# Patient Record
Sex: FEMALE | Race: BLACK OR AFRICAN AMERICAN
[De-identification: names, ages, dates, MRNs, and addresses within clinical notes are randomized per-mention and may not be internally consistent; named-entity substitution may affect disease eponyms.]

---

## 2017-03-08 ENCOUNTER — HOSPITAL ENCOUNTER (INPATIENT)
Dept: HOSPITAL 62 - ER | Age: 82
LOS: 5 days | Discharge: HOME | DRG: 853 | End: 2017-03-13
Attending: FAMILY MEDICINE | Admitting: FAMILY MEDICINE
Payer: SELF-PAY

## 2017-03-08 DIAGNOSIS — K80.20: ICD-10-CM

## 2017-03-08 DIAGNOSIS — Z79.899: ICD-10-CM

## 2017-03-08 DIAGNOSIS — E04.1: ICD-10-CM

## 2017-03-08 DIAGNOSIS — I10: ICD-10-CM

## 2017-03-08 DIAGNOSIS — G93.40: ICD-10-CM

## 2017-03-08 DIAGNOSIS — L97.519: ICD-10-CM

## 2017-03-08 DIAGNOSIS — E11.621: ICD-10-CM

## 2017-03-08 DIAGNOSIS — L02.611: ICD-10-CM

## 2017-03-08 DIAGNOSIS — Z79.84: ICD-10-CM

## 2017-03-08 DIAGNOSIS — E11.65: ICD-10-CM

## 2017-03-08 DIAGNOSIS — A41.9: Primary | ICD-10-CM

## 2017-03-08 DIAGNOSIS — E83.42: ICD-10-CM

## 2017-03-08 DIAGNOSIS — E87.6: ICD-10-CM

## 2017-03-08 LAB
ALBUMIN SERPL-MCNC: 4.7 G/DL (ref 3.5–5)
ALP SERPL-CCNC: 130 U/L (ref 38–126)
ALT SERPL-CCNC: 19 U/L (ref 9–52)
ANION GAP SERPL CALC-SCNC: 17 MMOL/L (ref 5–19)
ANISOCYTOSIS BLD QL SMEAR: SLIGHT
APPEARANCE UR: CLEAR
APTT BLD: 22.4 SEC (ref 23.5–35.8)
AST SERPL-CCNC: 28 U/L (ref 14–36)
BASOPHILS NFR BLD MANUAL: 0 % (ref 0–2)
BILIRUB DIRECT SERPL-MCNC: 0 MG/DL (ref 0–0.3)
BILIRUB SERPL-MCNC: 2.2 MG/DL (ref 0.2–1.3)
BILIRUB UR QL STRIP: NEGATIVE
BUN SERPL-MCNC: 14 MG/DL (ref 7–20)
CALCIUM: 10.1 MG/DL (ref 8.4–10.2)
CHLORIDE SERPL-SCNC: 91 MMOL/L (ref 98–107)
CK MB SERPL-MCNC: 0.52 NG/ML (ref ?–4.55)
CK SERPL-CCNC: 37 U/L (ref 30–135)
CO2 SERPL-SCNC: 26 MMOL/L (ref 22–30)
CREAT SERPL-MCNC: 0.73 MG/DL (ref 0.52–1.25)
EOSINOPHIL NFR BLD MANUAL: 1 % (ref 0–6)
ERYTHROCYTE [DISTWIDTH] IN BLOOD BY AUTOMATED COUNT: 15.9 % (ref 11.5–14)
GLUCOSE SERPL-MCNC: 607 MG/DL (ref 75–110)
GLUCOSE UR STRIP-MCNC: >=500 MG/DL
HCT VFR BLD CALC: 39.8 % (ref 36–47)
HGB BLD-MCNC: 13.3 G/DL (ref 12–15.5)
HGB HCT DIFFERENCE: 0.1
KETONES UR STRIP-MCNC: 20 MG/DL
MCH RBC QN AUTO: 27.4 PG (ref 27–33.4)
MCHC RBC AUTO-ENTMCNC: 33.4 G/DL (ref 32–36)
MCV RBC AUTO: 82 FL (ref 80–97)
NITRITE UR QL STRIP: NEGATIVE
NRBC BLD AUTO-RTO: 2 /100 WBC
PH UR STRIP: 6 [PH] (ref 5–9)
POTASSIUM SERPL-SCNC: 3.4 MMOL/L (ref 3.6–5)
PROT SERPL-MCNC: 9 G/DL (ref 6.3–8.2)
PROT UR STRIP-MCNC: NEGATIVE MG/DL
PROTHROMBIN TIME: 12.9 SEC (ref 11.4–15.4)
RBC # BLD AUTO: 4.84 10^6/UL (ref 3.72–5.28)
SODIUM SERPL-SCNC: 133.5 MMOL/L (ref 137–145)
SP GR UR STRIP: 1.02
TARGETS BLD QL SMEAR: (no result)
TOTAL CELLS COUNTED BLD: 100
TOXIC GRANULES BLD QL SMEAR: SLIGHT
TROPONIN I SERPL-MCNC: < 0.012 NG/ML
UROBILINOGEN UR-MCNC: NEGATIVE MG/DL (ref ?–2)
VARIANT LYMPHS NFR BLD MANUAL: 32 % (ref 13–45)
WBC # BLD AUTO: 11.3 10^3/UL (ref 4–10.5)

## 2017-03-08 PROCEDURE — 84481 FREE ASSAY (FT-3): CPT

## 2017-03-08 PROCEDURE — 87070 CULTURE OTHR SPECIMN AEROBIC: CPT

## 2017-03-08 PROCEDURE — 81001 URINALYSIS AUTO W/SCOPE: CPT

## 2017-03-08 PROCEDURE — 80053 COMPREHEN METABOLIC PANEL: CPT

## 2017-03-08 PROCEDURE — 87804 INFLUENZA ASSAY W/OPTIC: CPT

## 2017-03-08 PROCEDURE — 80307 DRUG TEST PRSMV CHEM ANLYZR: CPT

## 2017-03-08 PROCEDURE — 80202 ASSAY OF VANCOMYCIN: CPT

## 2017-03-08 PROCEDURE — 86235 NUCLEAR ANTIGEN ANTIBODY: CPT

## 2017-03-08 PROCEDURE — 71010: CPT

## 2017-03-08 PROCEDURE — 85652 RBC SED RATE AUTOMATED: CPT

## 2017-03-08 PROCEDURE — 82803 BLOOD GASES ANY COMBINATION: CPT

## 2017-03-08 PROCEDURE — 86225 DNA ANTIBODY NATIVE: CPT

## 2017-03-08 PROCEDURE — 87086 URINE CULTURE/COLONY COUNT: CPT

## 2017-03-08 PROCEDURE — 82945 GLUCOSE OTHER FLUID: CPT

## 2017-03-08 PROCEDURE — 82140 ASSAY OF AMMONIA: CPT

## 2017-03-08 PROCEDURE — 83036 HEMOGLOBIN GLYCOSYLATED A1C: CPT

## 2017-03-08 PROCEDURE — 84484 ASSAY OF TROPONIN QUANT: CPT

## 2017-03-08 PROCEDURE — 76536 US EXAM OF HEAD AND NECK: CPT

## 2017-03-08 PROCEDURE — 70450 CT HEAD/BRAIN W/O DYE: CPT

## 2017-03-08 PROCEDURE — 77003 FLUOROGUIDE FOR SPINE INJECT: CPT

## 2017-03-08 PROCEDURE — 85610 PROTHROMBIN TIME: CPT

## 2017-03-08 PROCEDURE — 71260 CT THORAX DX C+: CPT

## 2017-03-08 PROCEDURE — 82962 GLUCOSE BLOOD TEST: CPT

## 2017-03-08 PROCEDURE — 84157 ASSAY OF PROTEIN OTHER: CPT

## 2017-03-08 PROCEDURE — 93010 ELECTROCARDIOGRAM REPORT: CPT

## 2017-03-08 PROCEDURE — 85025 COMPLETE CBC W/AUTO DIFF WBC: CPT

## 2017-03-08 PROCEDURE — 83735 ASSAY OF MAGNESIUM: CPT

## 2017-03-08 PROCEDURE — 82553 CREATINE MB FRACTION: CPT

## 2017-03-08 PROCEDURE — 84100 ASSAY OF PHOSPHORUS: CPT

## 2017-03-08 PROCEDURE — 87040 BLOOD CULTURE FOR BACTERIA: CPT

## 2017-03-08 PROCEDURE — 36415 COLL VENOUS BLD VENIPUNCTURE: CPT

## 2017-03-08 PROCEDURE — 80076 HEPATIC FUNCTION PANEL: CPT

## 2017-03-08 PROCEDURE — 76705 ECHO EXAM OF ABDOMEN: CPT

## 2017-03-08 PROCEDURE — 86430 RHEUMATOID FACTOR TEST QUAL: CPT

## 2017-03-08 PROCEDURE — 93976 VASCULAR STUDY: CPT

## 2017-03-08 PROCEDURE — 84443 ASSAY THYROID STIM HORMONE: CPT

## 2017-03-08 PROCEDURE — 93005 ELECTROCARDIOGRAM TRACING: CPT

## 2017-03-08 PROCEDURE — 62270 DX LMBR SPI PNXR: CPT

## 2017-03-08 PROCEDURE — 89050 BODY FLUID CELL COUNT: CPT

## 2017-03-08 PROCEDURE — 74177 CT ABD & PELVIS W/CONTRAST: CPT

## 2017-03-08 PROCEDURE — 87205 SMEAR GRAM STAIN: CPT

## 2017-03-08 PROCEDURE — 85730 THROMBOPLASTIN TIME PARTIAL: CPT

## 2017-03-08 PROCEDURE — 82550 ASSAY OF CK (CPK): CPT

## 2017-03-08 PROCEDURE — 80048 BASIC METABOLIC PNL TOTAL CA: CPT

## 2017-03-08 PROCEDURE — 84439 ASSAY OF FREE THYROXINE: CPT

## 2017-03-08 NOTE — EKG REPORT
SEVERITY:- ABNORMAL ECG -

SINUS RHYTHM

FIRST DEGREE AV BLOCK

:

Confirmed by: Domonique Frey 08-Mar-2017 21:38:03

## 2017-03-08 NOTE — ER DOCUMENT REPORT
ED General





- General


Time seen by provider: 19:30


Mode of Arrival: Medic


Information source: Relative





<NILESEVERARDO ORTIZ - Last Filed: 03/08/17 20:00>





<GI MENA - Last Filed: 03/09/17 03:58>





- General


Chief Complaint: S/S of Possible Stroke


Stated Complaint: STROKE LIKE SYMPTOMS


Notes: 


81-year-old female with report by son that she became confused and had slurred 

speech and vomiting and complaint of right-sided headache about 5:00 this 

afternoon.  Son reports patient had some diarrhea yesterday but seemed fine 

this morning.  He reports that she is from [place] and immigrated here November.  

He is unfamiliar with most of her past medical history but says that she is on 

no medicines now.  Patient does not speak English but he reports that she is 

confused and not speaking clearly in her native language.  He reports that she 

is also not following commands and seems agitated.  2 His knowledge she has had 

no recent fever, chills, cough, or complaints of pain anywhere.


Physical Exam:





General: Alert, confused and agitated





HEENT: Normocephalic. Atraumatic. PERRLA. Extraocular movements intact. 

Oropharynx clear.  He does membranes dry





Neck: Supple. Non-tender.  No JVD





Respiratory: No respiratory distress. Clear and equal breath sounds bilaterally.





Cardiovascular: The cardiac rhythm. 





Abdominal: Normal Inspection. Soft, non-tender. No distension. Normal Bowel 

Sounds. 





Back: Non-tender. No deformity or step off.





Extremities: Moves all four extremities.


Upper extremities: Normal inspection. Non-tender. Normal color. Normal ROM. 

Normal temperature. 


Lower extremities: Normal inspection. Non-tender. No edema. Normal color. 

Normal ROM. Normal temperature. 





Neurological: Per the patient's son her speech is garbled.  She appears to have 

a minimal left facial droop but cannot cooperate with further cranial nerve 

testing.  She is observed to move all fours extremities spontaneously with what 

appears to be equal strength but cannot otherwise cooperate with formal 

neurologic testing.





. 





Skin: Warm. Dry. Normal color.


 (EVERARDO CAGE)





Past Medical History





- General


Cannot obtain history due to: Uncooperative, Altered mental status





- Social History


Smoking Status: Never Smoker


Family History: Other - None known to son patient could not answer


Renal/ Medical History: Denies: Hx Peritoneal Dialysis


Surgical Hx: Negative





<EVERARDO CAGE - Last Filed: 03/08/17 20:00>





Review of Systems





- Review of Systems


-: Yes ROS unobtainable due to patient's medical condition





<EVERARDO CAGE - Last Filed: 03/08/17 20:00>





Physical Exam





- Vital signs


Interpretation: Tachycardic, Febrile





- General


General appearance: Alert


In distress: Mild





- HEENT


Head: Normocephalic, Atraumatic


Cornea: Normal


Eyelashes: Normal


Pupils: PERRL


Nasal: Normal


Mucous membranes: Dry


Pharynx: Normal





- Respiratory


Respiratory status: No respiratory distress


Breath sounds: Decreased air movement - At bases





- Cardiovascular


Rhythm: Regular, Tachycardia





- Abdominal


Inspection: Normal


Tenderness: Nontender





- Back


Back: Normal





- Extremities


General upper extremity: Normal inspection, Normal ROM


General lower extremity: Normal inspection, Normal ROM





- Neurological


Cognition: Confused


Mina Coma Scale Eye Opening: Spontaneous


Mina Coma Scale Verbal: Oriented


Mina Coma Scale Motor: Obeys Commands


Mina Coma Scale Total: 15





- Psychological


Associated symptoms: Normal affect, Normal mood





- Skin


Skin Temperature: Hot





<GI MENA - Last Filed: 03/09/17 03:58>





- Vital signs


Vitals: 


 











Pulse Ox


 


 98 


 


 03/08/17 18:01














Course





- Laboratory


Result Diagrams: 


 03/08/17 18:53





 03/08/17 18:53





- Diagnostic Test


Radiology reviewed: Image reviewed, Reports reviewed





<EVERARDO CAGE - Last Filed: 03/08/17 20:00>





- Laboratory


Result Diagrams: 


 03/08/17 18:53





 03/08/17 18:53





<GI MENA - Last Filed: 03/09/17 03:58>





- Re-evaluation


Re-evalutation: 





03/09/17 03:55


Patient is a 81-year-old female who was brought into the emergency department 

for possible stroke.  Patient was initially evaluated by Dr. Cage.  Patient 

with no acute findings for CVA.  CT with no acute findings.  Patient was found 

to have a fever 103.  Son states that the patient has not had any recent 

infection.  Fever was treated and confusion resolving.  I do not have any 

specific source for infection but there are some questionable atelectasis and I 

suspect the patient has pneumonia.  Patient is interactive, smiling.  No nuchal 

rigidity.  I do not think that lumbar puncture is warranted on this 81-year-old 

patient at this time.  Patient was discussed with the hospitalist will be 

admitted to the ICU.  Antibiotics have been initiated after cultures were 

obtained.  Stable time of admission. (GI MENA)





- Vital Signs


Vital signs: 


 











Temp Pulse Resp BP Pulse Ox


 


 102.9 F H  80   14   164/104 H  97 


 


 03/09/17 00:48  03/08/17 18:20  03/08/17 22:00  03/08/17 21:00  03/08/17 22:00














- Laboratory


Laboratory results interpreted by me: 


 











  03/08/17 03/08/17 03/08/17





  18:53 18:53 19:40


 


WBC  11.3 H  


 


RDW  15.9 H  


 


APTT    22.4 L


 


Sodium   133.5 L 


 


Potassium   3.4 L 


 


Chloride   91 L 


 


Glucose   607 H* 


 


POC Glucose   


 


Total Bilirubin   2.2 H 


 


Alkaline Phosphatase   130 H 


 


Total Protein   9.0 H 


 


Urine Glucose (UA)   


 


Urine Ketones   














  03/08/17 03/08/17 03/08/17





  20:40 20:42 21:54


 


WBC   


 


RDW   


 


APTT   


 


Sodium   


 


Potassium   


 


Chloride   


 


Glucose   


 


POC Glucose  500 H*   219 H


 


Total Bilirubin   


 


Alkaline Phosphatase   


 


Total Protein   


 


Urine Glucose (UA)   >=500 H 


 


Urine Ketones   20 H 














  03/08/17 03/09/17





  23:01 00:55


 


WBC  


 


RDW  


 


APTT  


 


Sodium  


 


Potassium  


 


Chloride  


 


Glucose  


 


POC Glucose  277 H  344 H


 


Total Bilirubin  


 


Alkaline Phosphatase  


 


Total Protein  


 


Urine Glucose (UA)  


 


Urine Ketones  














Critical Care Note





- Critical Care Note


Total time excluding time spent on procedures (mins): 35 - evaluation and 

management of febrile patient with multiple re-evaluations and workup of fever





<GI MENA - Last Filed: 03/09/17 03:58>





Discharge





<EVERARDO CAGE - Last Filed: 03/08/17 20:00>





- Discharge


Admitting Provider: Hospitalist - [place]


Unit Admitted: ICU





<GI MENA - Last Filed: 03/09/17 03:58>





- Discharge


Clinical Impression: 


 New onset type 2 diabetes mellitus





Fever


Qualifiers:


 Fever type: unspecified Qualified Code(s): R50.9 - Fever, unspecified





Condition: Stable


Disposition: ADMITTED AS INPATIENT

## 2017-03-08 NOTE — ER DOCUMENT REPORT
ED Medical Screen (RME)





- General


Stated Complaint: STROKE LIKE SYMPTOMS


Notes: 


80 yo female brought to ED by family for slurred speech and confusion.  family 

found pt with vomit on her.  last seen normal approx 5pm.


pt speaks [place]. 


no previous hx/o CVA

## 2017-03-09 LAB
ALBUMIN SERPL-MCNC: 4.5 G/DL (ref 3.5–5)
ALP SERPL-CCNC: 104 U/L (ref 38–126)
ALT SERPL-CCNC: 28 U/L (ref 9–52)
ANION GAP SERPL CALC-SCNC: 12 MMOL/L (ref 5–19)
ANION GAP SERPL CALC-SCNC: 17 MMOL/L (ref 5–19)
APPEARANCE TUBE1 CSF: CLEAR
APPEARANCE TUBE2 CSF: CLEAR
AST SERPL-CCNC: 21 U/L (ref 14–36)
BARBITURATES UR QL SCN: NEGATIVE
BASE EXCESS BLDV CALC-SCNC: -2.7 MMOL/L
BASOPHILS # BLD AUTO: 0.1 10^3/UL (ref 0–0.2)
BASOPHILS NFR BLD AUTO: 0.6 % (ref 0–2)
BILIRUB DIRECT SERPL-MCNC: 0 MG/DL (ref 0–0.3)
BILIRUB SERPL-MCNC: 2.8 MG/DL (ref 0.2–1.3)
BUN SERPL-MCNC: 7 MG/DL (ref 7–20)
BUN SERPL-MCNC: 8 MG/DL (ref 7–20)
CALCIUM: 9.7 MG/DL (ref 8.4–10.2)
CALCIUM: 9.7 MG/DL (ref 8.4–10.2)
CHLORIDE SERPL-SCNC: 98 MMOL/L (ref 98–107)
CHLORIDE SERPL-SCNC: 98 MMOL/L (ref 98–107)
CO2 SERPL-SCNC: 25 MMOL/L (ref 22–30)
CO2 SERPL-SCNC: 30 MMOL/L (ref 22–30)
CREAT SERPL-MCNC: 0.47 MG/DL (ref 0.52–1.25)
CREAT SERPL-MCNC: 0.49 MG/DL (ref 0.52–1.25)
EOSINOPHIL # BLD AUTO: 0 10^3/UL (ref 0–0.6)
EOSINOPHIL NFR BLD AUTO: 0 % (ref 0–6)
ERYTHROCYTE [DISTWIDTH] IN BLOOD BY AUTOMATED COUNT: 16.1 % (ref 11.5–14)
ETHANOL SERPL-MCNC: < 10 MG/DL
GLUCOSE CSF-MCNC: 126 MG/DL (ref 40–70)
GLUCOSE SERPL-MCNC: 131 MG/DL (ref 75–110)
GLUCOSE SERPL-MCNC: 258 MG/DL (ref 75–110)
HCO3 BLDV-SCNC: 21.9 MMOL/L (ref 20–32)
HCT VFR BLD CALC: 38.1 % (ref 36–47)
HGB BLD-MCNC: 12.5 G/DL (ref 12–15.5)
HGB HCT DIFFERENCE: -0.6
LYMPHOCYTES # BLD AUTO: 2.2 10^3/UL (ref 0.5–4.7)
LYMPHOCYTES NFR BLD AUTO: 15.3 % (ref 13–45)
MAGNESIUM SERPL-MCNC: 1.5 MG/DL (ref 1.6–2.3)
MCH RBC QN AUTO: 27.5 PG (ref 27–33.4)
MCHC RBC AUTO-ENTMCNC: 32.9 G/DL (ref 32–36)
MCV RBC AUTO: 84 FL (ref 80–97)
METHADONE UR QL SCN: NEGATIVE
MONOCYTES # BLD AUTO: 0.9 10^3/UL (ref 0.1–1.4)
MONOCYTES NFR BLD AUTO: 5.9 % (ref 3–13)
NEUTROPHILS # BLD AUTO: 11.4 10^3/UL (ref 1.7–8.2)
NEUTS SEG NFR BLD AUTO: 78.2 % (ref 42–78)
PCO2 BLDV: 37.6 MMHG (ref 35–63)
PCP UR QL SCN: NEGATIVE
PH BLDV: 7.38 [PH] (ref 7.3–7.42)
PHOSPHATE SERPL-MCNC: 2.9 MG/DL (ref 2.5–4.5)
POTASSIUM SERPL-SCNC: 3.1 MMOL/L (ref 3.6–5)
POTASSIUM SERPL-SCNC: 3.1 MMOL/L (ref 3.6–5)
PROT SERPL-MCNC: 8.5 G/DL (ref 6.3–8.2)
RBC # BLD AUTO: 4.55 10^6/UL (ref 3.72–5.28)
RBC AVERAGE: 121.5
RBC DILUENT USED: (no result)
RBC DILUTION FACTOR: 1
RBC SIDE 1: 120
RBC SIDE 2: 123
SODIUM SERPL-SCNC: 139.5 MMOL/L (ref 137–145)
SODIUM SERPL-SCNC: 139.9 MMOL/L (ref 137–145)
T3FREE SERPL-MCNC: 2.79 PG/ML (ref 2.77–5.27)
TOTAL RBC SQUARES COUNTED: 125
URINE OPIATES LOW: NEGATIVE
WBC # BLD AUTO: 14.6 10^3/UL (ref 4–10.5)

## 2017-03-09 PROCEDURE — 009U3ZX DRAINAGE OF SPINAL CANAL, PERCUTANEOUS APPROACH, DIAGNOSTIC: ICD-10-PCS | Performed by: FAMILY MEDICINE

## 2017-03-09 PROCEDURE — B01B1ZZ FLUOROSCOPY OF SPINAL CORD USING LOW OSMOLAR CONTRAST: ICD-10-PCS | Performed by: FAMILY MEDICINE

## 2017-03-09 RX ADMIN — MAGNESIUM SULFATE IN DEXTROSE SCH ML: 10 INJECTION, SOLUTION INTRAVENOUS at 12:50

## 2017-03-09 RX ADMIN — Medication SCH ML: at 17:23

## 2017-03-09 RX ADMIN — PIPERACILLIN AND TAZOBACTAM SCH GM: 3; .375 INJECTION, POWDER, LYOPHILIZED, FOR SOLUTION INTRAVENOUS; PARENTERAL at 21:17

## 2017-03-09 RX ADMIN — ACYCLOVIR SODIUM SCH MG: 50 INJECTION, SOLUTION INTRAVENOUS at 17:31

## 2017-03-09 RX ADMIN — AMPICILLIN SCH GM: 2 INJECTION, POWDER, FOR SOLUTION INTRAVENOUS at 17:31

## 2017-03-09 RX ADMIN — ACYCLOVIR SODIUM SCH MG: 50 INJECTION, SOLUTION INTRAVENOUS at 10:52

## 2017-03-09 RX ADMIN — Medication SCH ML: at 21:27

## 2017-03-09 RX ADMIN — AMPICILLIN SCH GM: 2 INJECTION, POWDER, FOR SOLUTION INTRAVENOUS at 12:18

## 2017-03-09 RX ADMIN — SODIUM CHLORIDE AND POTASSIUM CHLORIDE PRN ML: .9; .15 SOLUTION INTRAVENOUS at 09:13

## 2017-03-09 RX ADMIN — MAGNESIUM SULFATE IN DEXTROSE SCH ML: 10 INJECTION, SOLUTION INTRAVENOUS at 12:00

## 2017-03-09 NOTE — PDOC H&P
History of Present Illness


Admission Date/PCP: 


  03/09/17 01:36





  


PCP--uncertain


Patient complains of: stroke like symptoms


History of Present Illness: 


JESUS SANDOVAL is a 81 year old known  female resident of [facility]

, having moved to this country in November to be with her son, who presents to 

the emergency room for evaluation of above.





Patient has been discussed with emergency room physician who evaluated the 

patient.  Patient is is able to provide no history whatsoever in terms of acute 

or chronic events, review of systems, personal habits, family history, etc. No 

friends or family are present. No inpatient records available for review.  





Per nursing staff, patient is more awake and alert than upon arrival but does 

not interact with her surroundings or staff in a meaningful fashion.  She 

speaks no English.  When we attempted to use the [employer] translation device, she 

would not respond despite a number of attempts by the online .





I left a generic voicemail to call me at the hospital as soon as possible with 

the contact number for her son at 2:30 AM on 03/09/2017.  Have not heard back.





Emergency room physician notes reviewed. No further information available this 

point in time. .


 


 


 


Laboratory results are listed in [employer] and are reviewed. 


 


 


X-ray summary results are listed below, with full report(s) reviewed. .


 


 


 


EKG reviewed.  No old EKG available for comparison.


 


 


 


Social history/personal habits: No further information available this point in 

time.


 


 


 


Allergies/adverse reactions No further information available this point in time.


 


 


Home medications according to his son's comments to ER staff, patient is on no 

medication at this point in time.


 





REVIEW OF SYSTEMS:  See history and present illness.No further information 

available this point in time.


 





 


 


PHYSICAL EXAMINATION:


 


5 feet 5 inches tall.  70.5 kg.  BMI 25.9 kg/m.  Blood pressure 140/93.  Pulse 

103 and regular.  95% saturation on room air.  Respirations are 19 and 

unlabored.  Temperature 102.9 shortly before 1 AM.  Skin does not feel is warm 

at the present time.





Well-nourished well-developed -American female who appears a fair number 

of years younger than her stated age.  She is awake, looking about the room in 

somewhat of a sluggish fashion.  Nonverbal.  Does not interact in a meaningful 

fashion with staff or her surroundings.





Female emergency room nurse Jania is present.


 


Skin is warm and dry.  No grossly obvious evidence of rash in areas of skin 

examined.  No subcutaneous nodules palpated.  No evidence of infection upon 

examination of all her skin surfaces, including buttock region, backs, and 

breasts.


 


ENT: [Hearing difficult to evaluate due to her current status.  No Tomlin sign


 


Eyes: No scleral icterus.  Pupils equal and reactive to light at 5 mm.  Pink 

conjunctivae.  No raccoon eyes


 


Neck is supple and nontender to gentle active range of motion and palpation.  

Midline trachea.  No palpable thyroid nodule mass enlargement or tenderness. 


 


Lymphatic: No palpable cervical or clavicular nodes. 


 


Neck and lymphatic exams limited by patient body habitus. 


 


Psychiatric: Not able to be adequately evaluated due to her current status.  


 


Lungs: Auscultation reveals clear and equal breath sounds bilaterally.  No use 

of accessory respiratory muscles. 


 


Cardiovascular: Heart regular rate and rhythm, without gallop murmur or rub.  

No carotid or abdominal aortic bruits.  No ankle or pedal edema.  Faintly 

palpable dorsalis pedis pulses. 


 


Abdomen: soft,  , slightly distended nontender with positive bowel sounds.  

Unable to adequately evaluate abdomen for masses or organomegaly due to 

distention.


 


Extremities: Hands and Feet are warm and dry.  No upper or lower extremity 

tenderness to compression.  No grossly obvious visual evidence of upper or 

lower extremity swelling.  Gentle manipulation of all 4 extremities fails to 

reveal any obvious evidence of injury or instability to involved major joints.  

Palpation of cranium, clavicles, thorax, upper and lower extremities, and 

pelvis failed to reveal any obvious evidence of injury or instability.


 


Neurologic: [Moves all 4 extremities grossly normally.  Patellar reflexes 

absent.  Absent Babinski.  Light touch can't be evaluated due to her current 

status..  No rigidity.  No ankle clonus.  No nystagmus.


 


 








Social History


Information Source: Emergency Med Personnel, [facility] Records


Lives with: Family - Likely lives with son.  Not completely certain.


Smoking Status: Unknown if Ever Smoked


Frequency of Alcohol Use: None - No  information available this point in time.


Hx Recreational Drug Use: No - No  information available this point in time.


Drugs: None - No  information available this point in time.


Hx Prescription Drug Abuse: No - No  information available this point in time.





- Advance Directive


Resuscitation Status: Full Code


Surrogate healthcare decision maker:: 


Probably son.





Family History


Family History: Other - None known to son patient could not answer


Parental Family History Reviewed: No - No  information available this point in 

time.


Children Family History Reviewed: No - No  information available this point in 

time.


Sibling(s) Family History Reviewed.: No - No  information available this point 

in time.





Physical Exam


Vital Signs: 


 











Temp Pulse Resp BP Pulse Ox


 


 102.9 F H  80   14   164/104 H  97 


 


 03/09/17 00:48  03/08/17 18:20  03/08/17 22:00  03/08/17 21:00  03/08/17 22:00














Results


Impressions: 


 





Chest X-Ray  03/08/17 18:01


IMPRESSION:  LOW LUNG VOLUMES.  NO SIGNIFICANT RADIOGRAPHIC FINDING IN THE 

CHEST.


 








Head CT  03/08/17 18:01


IMPRESSION:  CHRONIC CHANGES OF ATROPHY AND MICROVASCULAR ISCHEMIA.  NO ACUTE 

PROCESS.


 








Abdomen/Pelvis CT  03/09/17 00:00


IMPRESSION:  Gallstone.


L1 compression fracture age indeterminate.


 








Chest CT  03/09/17 00:00


IMPRESSION:  Dependent atelectasis.


1.7 cm mass in the thyroid gland on the right.


 














Assessment & Plan





- Diagnosis


(1) Acute encephalopathy


Is this a current diagnosis for this admission?: YesPlan: 





With fever, will proceed as for encephalitis, with empiric anabiotic coverage 

for meningitis.  Due to her elevated liver functions, will hold rifampin at 

that this point in time.





Planned lumbar puncture, image guided.





Multiple further labs.





Knee high SCDs for DVT prophylaxis,; with plan lumbar puncture, will forego 

Lovenox or heparin at this point in time.


 


Time spent in evaluation and management of patient: 65 minutes.








(2) Cholelithiasis


Qualifiers: 


     Cholelithiasis location: gallbladder     Cholecystitis presence: without 

cholecystitis


Is this a current diagnosis for this admission?: YesPlan: 


Abdomen soft and completely nontender.  No bile duct dilatation on CT.  Follow 

clinically at this point in time.








(3) Elevated LFTs


Is this a current diagnosis for this admission?: YesPlan: 


Follow-up chemistry.








(4) Fever


Qualifiers: 


     Fever type: unspecified        Qualified Code(s): R50.9 - Fever, 

unspecified  


Is this a current diagnosis for this admission?: Yes





(5) Hyperglycemia


Is this a current diagnosis for this admission?: YesPlan: 


Continue insulin drip.  Hourly Accu-Cheks.  Serial chemistry.








(6) Hypokalemia


Is this a current diagnosis for this admission?: YesPlan: 


Follow-up chemistry.  Supplemented as necessary.








(7) Lumbar compression fracture


Qualifiers: 


     Encounter type: initial encounter


Is this a current diagnosis for this admission?: Yes





(8) Thyroid nodule


Is this a current diagnosis for this admission?: YesPlan: 


Thyroid ultrasound.











- Inpatient Certification


Based on my medical assessment, after consideration of the patient's 

comorbidities, presenting symptoms, or acuity I expect that the services needed 

warrant INPATIENT care.: Yes


I certify that my determination is in accordance with my understanding of 

Medicare's requirements for reasonable and necessary INPATIENT services [42 CFR 

412.3e].: Yes


Medical Necessity: Need Close Monitoring Due to Risk of Patient Decompensation, 

Need For IV Fluids, Need For Continuous Telemetry Monitoring, Need for IV 

Antibiotics, Risk of Complication if Not Cared For in Hospital, Risk of 

Diagnosis Which Will Require Inpatient Eval/Care/Monitoring


Post Hospital Care: D/C or Transfer Summary

## 2017-03-09 NOTE — PDOC PROGRESS REPORT
Subjective


Progress Note for:: 03/09/17


Subjective:: 


Patient is stable per nursing staff.  I cannot communicate with patient she 

does not speak English and [employer] service is not able to 

provide translation for patient's native language either.  She is alert and 

verbal in her native language and seems to be smiling and laughing at times 

during my evaluation.





Physical Exam


Vital Signs: 


 











Temp Pulse Resp BP Pulse Ox


 


 99.0 F   91   15   135/76 H  96 


 


 03/09/17 13:00  03/09/17 12:00  03/09/17 12:00  03/09/17 12:28  03/09/17 13:00








 Intake & Output











 03/08/17 03/09/17 03/10/17





 06:59 06:59 06:59


 


Intake Total  2000 


 


Output Total  2000 325


 


Balance  0 -325


 


Weight  54 kg 








GENERAL: No acute distress


HEENT: Conjunctiva clear, nonicteric, moist mucous membranes, no JVD, midline 

trachea


RESPIRATORY: Clear to auscultation bilaterally, no wheezes, no rhonchi


CARDIAC: Regular rate and rhythm, no murmurs/gallops/rubs


ABDOMEN: Soft, nondistended, nontender, positive bowel sounds, no rebound, no 

guarding


EXTREMETIES: No edema, cyanosis, clubbing


NEUROLOGIC: Alert, unable to ask orientation question secondary to language 

barrier, CN's grossly intact,  no focal deficits


SKIN: No rash, wounds


PSYCH: Normal mood, normal affect








Results


Laboratory Results: 


 





 03/09/17 04:15 





 03/09/17 10:19 





 











  03/09/17 03/09/17 03/09/17





  04:15 04:15 04:15


 


WBC  14.6 H  


 


RBC  4.55  


 


Hgb  12.5  


 


Hct  38.1  


 


MCV  84  


 


MCH  27.5  


 


MCHC  32.9  


 


RDW  16.1 H  


 


Plt Count  167  


 


Seg Neutrophils %  78.2 H  


 


Lymphocytes %  15.3  


 


Monocytes %  5.9  


 


Eosinophils %  0.0  


 


Basophils %  0.6  


 


Absolute Neutrophils  11.4 H  


 


Absolute Lymphocytes  2.2  


 


Absolute Monocytes  0.9  


 


Absolute Eosinophils  0.0  


 


Absolute Basophils  0.1  


 


VBG pH   


 


VBG pCO2   


 


VBG HCO3   


 


VBG Base Excess   


 


Sodium   139.5 


 


Potassium   3.1 L 


 


Chloride   98 


 


Carbon Dioxide   25 


 


Anion Gap   17 


 


BUN   8 


 


Creatinine   0.47 L 


 


Est GFR ( Amer)   > 60 


 


Est GFR (Non-Af Amer)   > 60 


 


Glucose   258 H 


 


Calcium   9.7 


 


Phosphorus   2.9 


 


Magnesium   1.5 L 


 


Total Bilirubin   2.8 H 


 


AST   21 


 


ALT   28 


 


Alkaline Phosphatase   104 


 


Ammonia    < 8.7 L


 


Total Protein   8.5 H 


 


Albumin   4.5 


 


TSH   


 


Free T4   


 


Free T3 pg/mL   














  03/09/17 03/09/17 03/09/17





  04:15 04:15 04:15


 


WBC   


 


RBC   


 


Hgb   


 


Hct   


 


MCV   


 


MCH   


 


MCHC   


 


RDW   


 


Plt Count   


 


Seg Neutrophils %   


 


Lymphocytes %   


 


Monocytes %   


 


Eosinophils %   


 


Basophils %   


 


Absolute Neutrophils   


 


Absolute Lymphocytes   


 


Absolute Monocytes   


 


Absolute Eosinophils   


 


Absolute Basophils   


 


VBG pH   7.38 


 


VBG pCO2   37.6 


 


VBG HCO3   21.9 


 


VBG Base Excess   -2.7 


 


Sodium   


 


Potassium   


 


Chloride   


 


Carbon Dioxide   


 


Anion Gap   


 


BUN   


 


Creatinine   


 


Est GFR ( Amer)   


 


Est GFR (Non-Af Amer)   


 


Glucose   


 


Calcium   


 


Phosphorus   


 


Magnesium   


 


Total Bilirubin   


 


AST   


 


ALT   


 


Alkaline Phosphatase   


 


Ammonia   


 


Total Protein   


 


Albumin   


 


TSH  0.09 L  


 


Free T4    1.61


 


Free T3 pg/mL    2.79














  03/09/17





  10:19


 


WBC 


 


RBC 


 


Hgb 


 


Hct 


 


MCV 


 


MCH 


 


MCHC 


 


RDW 


 


Plt Count 


 


Seg Neutrophils % 


 


Lymphocytes % 


 


Monocytes % 


 


Eosinophils % 


 


Basophils % 


 


Absolute Neutrophils 


 


Absolute Lymphocytes 


 


Absolute Monocytes 


 


Absolute Eosinophils 


 


Absolute Basophils 


 


VBG pH 


 


VBG pCO2 


 


VBG HCO3 


 


VBG Base Excess 


 


Sodium  139.9


 


Potassium  3.1 L


 


Chloride  98


 


Carbon Dioxide  30


 


Anion Gap  12


 


BUN  7


 


Creatinine  0.49 L


 


Est GFR ( Amer)  > 60


 


Est GFR (Non-Af Amer)  > 60


 


Glucose  131 H


 


Calcium  9.7


 


Phosphorus 


 


Magnesium 


 


Total Bilirubin 


 


AST 


 


ALT 


 


Alkaline Phosphatase 


 


Ammonia 


 


Total Protein 


 


Albumin 


 


TSH 


 


Free T4 


 


Free T3 pg/mL 








 











  03/09/17





  04:15


 


Troponin I  < 0.012











Impressions: 


 





Chest X-Ray  03/08/17 18:01


IMPRESSION:  LOW LUNG VOLUMES.  NO SIGNIFICANT RADIOGRAPHIC FINDING IN THE 

CHEST.


 








Head CT  03/08/17 18:01


IMPRESSION:  CHRONIC CHANGES OF ATROPHY AND MICROVASCULAR ISCHEMIA.  NO ACUTE 

PROCESS.


 








Abdomen/Pelvis CT  03/09/17 00:00


IMPRESSION:  Gallstone.


L1 compression fracture age indeterminate.


 








Chest CT  03/09/17 00:00


IMPRESSION:  Dependent atelectasis.


1.7 cm mass in the thyroid gland on the right.


 








Thyroid Ultrasound  03/09/17 00:00


IMPRESSION:  Left and right lobe 2 cm complex solid-cystic nodules with 

internal blood flow demonstrated on color Doppler interrogation.


 














Assessment & Plan





- Diagnosis


(1) Sepsis





Is this a current diagnosis for this admission?: YesPlan: 


Etiology unclear.  Influenza screen negative.  Patient has cholelithiasis noted 

on CT scan of the abdomen and pelvis.  I will check right upper quadrant 

ultrasound to exclude further evidence of acute cholecystitis.  Lumbar puncture 

is pending although patient does not have any definite meningeal signs.  CT 

scan of the chest is negative for pneumonia.  Urinalysis is negative.








(2) New onset type 2 diabetes mellitus


Is this a current diagnosis for this admission?: YesPlan: 


Continue sliding scale insulin coverage.  Discontinue Decadron if lumbar 

puncture negative for CNS infection.








(3) Cholelithiasis


Qualifiers: 


     Cholelithiasis location: gallbladder     Cholecystitis presence: without 

cholecystitis


Is this a current diagnosis for this admission?: YesPlan: 


Given the elevated total bilirubin I would like to check right upper quadrant 

ultrasound.








(4) Hypokalemia


Is this a current diagnosis for this admission?: YesPlan: 


Replace as needed.  Address hypomagnesemia as well.








(5) Hypomagnesemia


Is this a current diagnosis for this admission?: Yes





(6) Thyroid nodule


Is this a current diagnosis for this admission?: YesPlan: 





Thyroid Ultrasound  03/09/17 00:00


IMPRESSION:  Left and right lobe 2 cm complex solid-cystic nodules with 

internal blood flow demonstrated on color Doppler interrogation.





This will need further outpatient workup.











- Time


Time Spent with patient: 35 or more minutes


Anticipated discharge: Home


Disposition: 


Patient is from [facility] and here visiting her son who is apparently a [employer] 

.  She does not speak English and tele- services do not 

speak patient's native language.

## 2017-03-10 LAB
ANION GAP SERPL CALC-SCNC: 15 MMOL/L (ref 5–19)
ANISOCYTOSIS BLD QL SMEAR: (no result)
BASOPHILS NFR BLD MANUAL: 0 % (ref 0–2)
BUN SERPL-MCNC: 11 MG/DL (ref 7–20)
CALCIUM: 9.5 MG/DL (ref 8.4–10.2)
CHLORIDE SERPL-SCNC: 100 MMOL/L (ref 98–107)
CO2 SERPL-SCNC: 23 MMOL/L (ref 22–30)
CREAT SERPL-MCNC: 0.44 MG/DL (ref 0.52–1.25)
EOSINOPHIL NFR BLD MANUAL: 0 % (ref 0–6)
ERYTHROCYTE [DISTWIDTH] IN BLOOD BY AUTOMATED COUNT: 15.5 % (ref 11.5–14)
GLUCOSE SERPL-MCNC: 191 MG/DL (ref 75–110)
HCT VFR BLD CALC: 35.6 % (ref 36–47)
HGB BLD-MCNC: 12 G/DL (ref 12–15.5)
HGB HCT DIFFERENCE: 0.4
MAGNESIUM SERPL-MCNC: 1.9 MG/DL (ref 1.6–2.3)
MCH RBC QN AUTO: 27.4 PG (ref 27–33.4)
MCHC RBC AUTO-ENTMCNC: 33.8 G/DL (ref 32–36)
MCV RBC AUTO: 81 FL (ref 80–97)
NEUTS BAND NFR BLD MANUAL: 1 % (ref 3–5)
NRBC BLD AUTO-RTO: 1 /100 WBC
OVALOCYTES BLD QL SMEAR: SLIGHT
POIKILOCYTOSIS BLD QL SMEAR: (no result)
POLYCHROMASIA BLD QL SMEAR: (no result)
POTASSIUM SERPL-SCNC: 4.5 MMOL/L (ref 3.6–5)
RBC # BLD AUTO: 4.38 10^6/UL (ref 3.72–5.28)
SCHISTOCYTES BLD QL SMEAR: SLIGHT
SODIUM SERPL-SCNC: 138 MMOL/L (ref 137–145)
TARGETS BLD QL SMEAR: (no result)
TOTAL CELLS COUNTED BLD: 100
TROUGH DRAW TIME: 2200
VARIANT LYMPHS NFR BLD MANUAL: 19 % (ref 13–45)
WBC # BLD AUTO: 15.1 10^3/UL (ref 4–10.5)

## 2017-03-10 PROCEDURE — 0JDQ0ZZ EXTRACTION OF RIGHT FOOT SUBCUTANEOUS TISSUE AND FASCIA, OPEN APPROACH: ICD-10-PCS | Performed by: SURGERY

## 2017-03-10 PROCEDURE — 0J9Q0ZZ DRAINAGE OF RIGHT FOOT SUBCUTANEOUS TISSUE AND FASCIA, OPEN APPROACH: ICD-10-PCS | Performed by: SURGERY

## 2017-03-10 RX ADMIN — AMPICILLIN SODIUM AND SULBACTAM SODIUM SCH GM: 1; .5 INJECTION, POWDER, FOR SOLUTION INTRAMUSCULAR; INTRAVENOUS at 20:49

## 2017-03-10 RX ADMIN — AMPICILLIN SODIUM AND SULBACTAM SODIUM SCH GM: 1; .5 INJECTION, POWDER, FOR SOLUTION INTRAMUSCULAR; INTRAVENOUS at 16:06

## 2017-03-10 RX ADMIN — VANCOMYCIN HYDROCHLORIDE SCH MG: 1 INJECTION, POWDER, LYOPHILIZED, FOR SOLUTION INTRAVENOUS at 18:26

## 2017-03-10 RX ADMIN — CEFTRIAXONE SCH ML: 2 INJECTION, SOLUTION INTRAVENOUS at 13:21

## 2017-03-10 RX ADMIN — SODIUM CHLORIDE AND POTASSIUM CHLORIDE PRN ML: .9; .15 SOLUTION INTRAVENOUS at 06:35

## 2017-03-10 RX ADMIN — Medication SCH ML: at 22:44

## 2017-03-10 RX ADMIN — Medication SCH ML: at 11:55

## 2017-03-10 RX ADMIN — INSULIN LISPRO PRN UNIT: 100 INJECTION, SOLUTION INTRAVENOUS; SUBCUTANEOUS at 17:19

## 2017-03-10 RX ADMIN — ACYCLOVIR SODIUM SCH MG: 50 INJECTION, SOLUTION INTRAVENOUS at 22:41

## 2017-03-10 RX ADMIN — PIPERACILLIN AND TAZOBACTAM SCH GM: 3; .375 INJECTION, POWDER, LYOPHILIZED, FOR SOLUTION INTRAVENOUS; PARENTERAL at 09:19

## 2017-03-10 RX ADMIN — ACYCLOVIR SODIUM SCH MG: 50 INJECTION, SOLUTION INTRAVENOUS at 13:57

## 2017-03-10 RX ADMIN — Medication SCH ML: at 13:28

## 2017-03-10 RX ADMIN — ACETAMINOPHEN PRN MG: 325 TABLET ORAL at 17:24

## 2017-03-10 RX ADMIN — LISINOPRIL SCH MG: 10 TABLET ORAL at 17:24

## 2017-03-10 RX ADMIN — PIPERACILLIN AND TAZOBACTAM SCH GM: 3; .375 INJECTION, POWDER, LYOPHILIZED, FOR SOLUTION INTRAVENOUS; PARENTERAL at 06:13

## 2017-03-10 RX ADMIN — Medication SCH ML: at 06:18

## 2017-03-10 RX ADMIN — METFORMIN HYDROCHLORIDE SCH MG: 500 TABLET, FILM COATED ORAL at 17:23

## 2017-03-10 RX ADMIN — SODIUM CHLORIDE AND POTASSIUM CHLORIDE PRN ML: .9; .15 SOLUTION INTRAVENOUS at 11:35

## 2017-03-10 NOTE — OPERATIVE REPORT E
Operative Report



NAME: JESUS SANDOVAL

MRN:  [id number]          : 1935 AGE:  81Y

DATE OF SURGERY:  03/10/2017             ROOM: [id number]



BEDSIDE OPERATIVE PROCEDURE



PREOPERATIVE DIAGNOSIS:

A 81-YEAR-OLD FEMALE PATIENT WITH DIAGNOSIS OF RIGHT GREAT TOE SOFT TISSUE

INFECTION.



POSTOPERATIVE DIAGNOSIS:

BASICALLY, RIGHT GREAT TOE ABSCESS AT THE TIP OF DISTAL PHALANX WITH

NECROTIC TISSUE EXTENDING UP TO THE TENDON LEVEL.



OPERATION:

Incision and drainage of abscess of great toe of distal phalanx and

limited debridement of the necrotic tissue up to the deep fascial tissue.



SURGEON:

MARCELA WEBB M.D.



PROCEDURE:

Performed on the bedside.  The patient has diabetic foot and does not feel

any pain denied any fever .  After clipping, area was cleaned and

draped in a sterile field and then with sharp scissors, abscess cavity and

the tip of the right great toe over the proximal phalanx was opened up and

abscess about 2 mL of the pus was drained and then underlying necrotic

soft tissue was debrided up to the tendinous level.  The wound appeared to

be at this point completely absolutely healthy, so at this point wound was

irrigated, dressings were applied.  The patient tolerated the procedure

very well.







DICTATING PHYSICIAN:  MARCELA WEBB M.D.





[id number]                  DT: 03/10/2017    1647

PHY#: [id number]            DD: 03/10/2017    1635

ID:   [id number]           JOB#: [id number]       ACCT: [id number]



cc:MARCELA WEBB M.D.

>







[facility]

## 2017-03-10 NOTE — PDOC PROGRESS REPORT
Subjective


Progress Note for:: 03/10/17


Subjective:: 


I have seen patient in presence of her son and he translated. She complains of 

right first toe pain, swelling. No fever x 24hr. 





Physical Exam


Vital Signs: 


 











Temp Pulse Resp BP Pulse Ox


 


 98.1 F   93   18   155/77 H  99 


 


 03/10/17 11:39  03/10/17 11:39  03/10/17 11:39  03/10/17 11:39  03/10/17 11:39








 Intake & Output











 03/09/17 03/10/17 03/11/17





 06:59 06:59 06:59


 


Intake Total 2000 2475 480


 


Output Total 2000 4025 400


 


Balance 0 -1550 80


 


Weight 54 kg 56.2 kg 








GENERAL: No acute distress


HEENT: Conjunctiva clear, nonicteric, moist mucous membranes, no JVD, midline 

trachea


RESPIRATORY: Clear to auscultation bilaterally, no wheezes, no rhonchi


CARDIAC: Regular rate and rhythm, no murmurs/gallops/rubs


ABDOMEN: Soft, nondistended, nontender, positive bowel sounds, no rebound, no 

guarding


EXTREMETIES: No edema, cyanosis, clubbing


NEUROLOGIC: Alert, oriented x 3, CN's grossly intact,  no focal deficits


SKIN: abscess/ulcer of right 1st toe


PSYCH: Normal mood, normal affect





Results


Laboratory Results: 


 





 03/10/17 06:11 





 03/10/17 06:11 





 











  03/09/17 03/09/17 03/09/17





  16:15 16:15 16:15


 


WBC   


 


RBC   


 


Hgb   


 


Hct   


 


MCV   


 


MCH   


 


MCHC   


 


RDW   


 


Plt Count   


 


Seg Neutrophils %   


 


Lymphocytes %   


 


Monocytes %   


 


Eosinophils %   


 


Basophils %   


 


Absolute Neutrophils   


 


Absolute Lymphocytes   


 


Absolute Monocytes   


 


Absolute Eosinophils   


 


Absolute Basophils   


 


Sodium   


 


Potassium   


 


Chloride   


 


Carbon Dioxide   


 


Anion Gap   


 


BUN   


 


Creatinine   


 


Est GFR ( Amer)   


 


Est GFR (Non-Af Amer)   


 


Glucose   


 


Calcium   


 


Magnesium   


 


Fluid Tube Number  1  


 


CSF Volume  1.3  


 


CSF WBC  36 H  


 


CSF RBC  243  


 


CSF Color (1)  COLORLESS  


 


CSF Appearance (1)  CLEAR  


 


CSF Color (2)  COLORLESS  


 


CSF Appearance (2)  CLEAR  


 


CSF Comment   


 


CSF Glucose   126 H 


 


CSF Total Protein PEP    Cancelled


 


CSF Total Protein   79 H 


 


CSF Prealbumin    Cancelled


 


CSF Albumin    Cancelled


 


CSF Alpha-1-Globulin    Cancelled


 


CSF Alpha-2-Globulin    Cancelled


 


CSF Beta Globulin    Cancelled


 


CSF Gamma Globulin    Cancelled


 


CSF PEP M-Ralph    Cancelled














  03/09/17 03/10/17 03/10/17





  16:15 06:11 06:11


 


WBC   15.1 H 


 


RBC   4.38 


 


Hgb   12.0 


 


Hct   35.6 L 


 


MCV   81 


 


MCH   27.4 


 


MCHC   33.8 


 


RDW   15.5 H 


 


Plt Count   163 


 


Seg Neutrophils %   Not Reportable 


 


Lymphocytes %   Not Reportable 


 


Monocytes %   Not Reportable 


 


Eosinophils %   Not Reportable 


 


Basophils %   Not Reportable 


 


Absolute Neutrophils   Not Reportable 


 


Absolute Lymphocytes   Not Reportable 


 


Absolute Monocytes   Not Reportable 


 


Absolute Eosinophils   Not Reportable 


 


Absolute Basophils   Not Reportable 


 


Sodium    138.0


 


Potassium    4.5  D


 


Chloride    100


 


Carbon Dioxide    23


 


Anion Gap    15


 


BUN    11


 


Creatinine    0.44 L


 


Est GFR ( Amer)    > 60


 


Est GFR (Non-Af Amer)    > 60


 


Glucose    191 H


 


Calcium    9.5


 


Magnesium    1.9


 


Fluid Tube Number   


 


CSF Volume   


 


CSF WBC   


 


CSF RBC   


 


CSF Color (1)   


 


CSF Appearance (1)   


 


CSF Color (2)   


 


CSF Appearance (2)   


 


CSF Comment  Cancelled  


 


CSF Glucose   


 


CSF Total Protein PEP   


 


CSF Total Protein   


 


CSF Prealbumin   


 


CSF Albumin   


 


CSF Alpha-1-Globulin   


 


CSF Alpha-2-Globulin   


 


CSF Beta Globulin   


 


CSF Gamma Globulin   


 


CSF PEP M-Ralph   








 











  03/09/17





  04:15


 


Troponin I  < 0.012











Impressions: 


 





Chest X-Ray  03/08/17 18:01


IMPRESSION:  LOW LUNG VOLUMES.  NO SIGNIFICANT RADIOGRAPHIC FINDING IN THE 

CHEST.


 








Head CT  03/08/17 18:01


IMPRESSION:  CHRONIC CHANGES OF ATROPHY AND MICROVASCULAR ISCHEMIA.  NO ACUTE 

PROCESS.


 








Abdomen/Pelvis CT  03/09/17 00:00


IMPRESSION:  Gallstone.


L1 compression fracture age indeterminate.


 








Chest CT  03/09/17 00:00


IMPRESSION:  Dependent atelectasis.


1.7 cm mass in the thyroid gland on the right.


 








Guidance Fluoroscopy  03/09/17 00:00


IMPRESSION:  Lumbar puncture under fluoroscopy. No immediate complication.   

Very limited low volume tap, 1.5 mL of fluid was obtained.


 








Thyroid Ultrasound  03/09/17 00:00


IMPRESSION:  Left and right lobe 2 cm complex solid-cystic nodules with 

internal blood flow demonstrated on color Doppler interrogation.


 








Lumbar Puncture  03/09/17 02:42


IMPRESSION:  Lumbar puncture under fluoroscopy. No immediate complication.   

Very limited low volume tap, 1.5 mL of fluid was obtained.


 








Abdomen Ultrasound  03/09/17 08:15


IMPRESSION:  Somewhat limited study as noted above.  Gallstones are identified.

  Other findings as noted above


 














Assessment & Plan





- Diagnosis


(1) Sepsis





Is this a current diagnosis for this admission?: YesPlan: 





Likely due to DM right foot infection.  





Influenza screen negative.  Patient has cholelithiasis noted on CT scan of the 

abdomen and pelvis.  Right upper quadrant ultrasound with no acute process.  

Lumbar puncture does not have any definite evidence of CNS infection, but 

culture pending.  CT scan of the chest is negative for pneumonia.  Urinalysis 

is negative.





Continue IV Rocephin, Vanc, Unasyn until CSF cultures negative x 48hr.








(2) Diabetic foot ulcer


Qualifiers: 


     Diabetic foot ulcer location: toe     Diabetes mellitus type: type 2     

Laterality: right     Non-pressure ulcer stage: unspecified non-pressure ulcer 

stage        Qualified Code(s): E11.621 - Type 2 diabetes mellitus with foot 

ulcer; L97.519 - Non-pressure chronic ulcer of other part of right foot with 

unspecified severity  


Is this a current diagnosis for this admission?: YesPlan: 


Continue antibiotics. Check xray. Consult surgery. 








(3) New onset type 2 diabetes mellitus


Is this a current diagnosis for this admission?: YesPlan: 





Continue sliding scale insulin coverage. Start metformin 500mg BID. Will need 

to arrange local PCP after discharge.








(4) Cholelithiasis


Qualifiers: 


     Cholelithiasis location: gallbladder     Cholecystitis presence: without 

cholecystitis


Is this a current diagnosis for this admission?: YesPlan: 





No infection.








(5) Hypokalemia


Is this a current diagnosis for this admission?: Yes





(6) Hypomagnesemia


Is this a current diagnosis for this admission?: Yes





(7) Thyroid nodule


Is this a current diagnosis for this admission?: YesPlan: 





Thyroid Ultrasound  03/09/17 00:00


IMPRESSION:  Left and right lobe 2 cm complex solid-cystic nodules with 

internal blood flow demonstrated on color Doppler interrogation.





This will need further outpatient workup.











- Time


Time Spent with patient: 35 or more minutes

## 2017-03-10 NOTE — CONSULTATION REPORT E
Consultation Report



NAME: JESUS SANDOVAL

MRN:  [id number]               : 1935      AGE: 81Y

DATE: 03/10/2017     328  A



TO:   MARCELA WEBB M.D.



FROM: DALE KAY M.D.

      Requesting Physician



HISTORY OF PRESENT ILLNESS:

A 81-year-old female patient consulted from medical service for grade 2

infection at the tip of the great toe right side.  The patient cannot

communicate with any English, apparently a diabetic and known to have a

sore on the right great toe, and she also has an elevated white count. 

Again, cannot obtain a history from the patient but from medical records,

diabetes.



PHYSICAL EXAMINATION:

GENERAL:  Elderly female patient not in any distress.



HEAD/NECK:  No lymphadenopathy, no masses.



RESPIRATORY:  Both lungs good air entry.



CARDIOVASCULAR:  Heart sounds regular.



ABDOMINAL:  Soft, nontender.



EXTREMITIES:  Lower extremities are warm and well perfused.  Right great

toe, there is a blister with a soft tissue abscess surrounding the tip of

the distal phalanx.  The foot itself is normal.  No palpable pedal pulses

but she has warm feet on both sides.



IMPRESSION OVERALL:

Right great toe infection and abscess with soft tissue necrosis.



PLAN:

We will perform excisional sharp debridement at the bedside.







DICTATING PHYSICIAN: MARCELA WEBB M.D.





[id number]                  DT: 03/10/2017    1703

PHY#: [id number]            DD: 03/10/2017    1634

ID:   [id number]           JOB#: [id number]      ACCT: [id number]



cc:MARCELA WEBB M.D.

>







MTDD

## 2017-03-11 LAB
ALBUMIN SERPL-MCNC: 3.6 G/DL (ref 3.5–5)
ALP SERPL-CCNC: 78 U/L (ref 38–126)
ALT SERPL-CCNC: 30 U/L (ref 9–52)
ANION GAP SERPL CALC-SCNC: 12 MMOL/L (ref 5–19)
AST SERPL-CCNC: 19 U/L (ref 14–36)
BASOPHILS # BLD AUTO: 0.1 10^3/UL (ref 0–0.2)
BASOPHILS NFR BLD AUTO: 1.2 % (ref 0–2)
BILIRUB DIRECT SERPL-MCNC: 0 MG/DL (ref 0–0.3)
BILIRUB SERPL-MCNC: 1.6 MG/DL (ref 0.2–1.3)
BUN SERPL-MCNC: 8 MG/DL (ref 7–20)
CALCIUM: 9.2 MG/DL (ref 8.4–10.2)
CHLORIDE SERPL-SCNC: 102 MMOL/L (ref 98–107)
CO2 SERPL-SCNC: 26 MMOL/L (ref 22–30)
CREAT SERPL-MCNC: 0.47 MG/DL (ref 0.52–1.25)
ENA JO1 AB SER-ACNC: <0.2 AI (ref 0–0.9)
EOSINOPHIL # BLD AUTO: 0.5 10^3/UL (ref 0–0.6)
EOSINOPHIL NFR BLD AUTO: 6.2 % (ref 0–6)
ERYTHROCYTE [DISTWIDTH] IN BLOOD BY AUTOMATED COUNT: 15.5 % (ref 11.5–14)
GLUCOSE SERPL-MCNC: 199 MG/DL (ref 75–110)
HCT VFR BLD CALC: 32.6 % (ref 36–47)
HGB BLD-MCNC: 11.1 G/DL (ref 12–15.5)
HGB HCT DIFFERENCE: 0.7
LYMPHOCYTES # BLD AUTO: 2.5 10^3/UL (ref 0.5–4.7)
LYMPHOCYTES NFR BLD AUTO: 31.6 % (ref 13–45)
MCH RBC QN AUTO: 27.6 PG (ref 27–33.4)
MCHC RBC AUTO-ENTMCNC: 34.1 G/DL (ref 32–36)
MCV RBC AUTO: 81 FL (ref 80–97)
MONOCYTES # BLD AUTO: 0.7 10^3/UL (ref 0.1–1.4)
MONOCYTES NFR BLD AUTO: 9.1 % (ref 3–13)
NEUTROPHILS # BLD AUTO: 4.1 10^3/UL (ref 1.7–8.2)
NEUTS SEG NFR BLD AUTO: 51.9 % (ref 42–78)
POTASSIUM SERPL-SCNC: 3.5 MMOL/L (ref 3.6–5)
PROT SERPL-MCNC: 6.6 G/DL (ref 6.3–8.2)
RBC # BLD AUTO: 4.02 10^6/UL (ref 3.72–5.28)
SODIUM SERPL-SCNC: 139.9 MMOL/L (ref 137–145)
WBC # BLD AUTO: 8 10^3/UL (ref 4–10.5)

## 2017-03-11 RX ADMIN — ACETAMINOPHEN PRN MG: 325 TABLET ORAL at 08:43

## 2017-03-11 RX ADMIN — METFORMIN HYDROCHLORIDE SCH MG: 500 TABLET, FILM COATED ORAL at 17:20

## 2017-03-11 RX ADMIN — DOXYCYCLINE HYCLATE SCH MG: 100 TABLET ORAL at 21:51

## 2017-03-11 RX ADMIN — TRAMADOL HYDROCHLORIDE PRN MG: 50 TABLET, FILM COATED ORAL at 17:50

## 2017-03-11 RX ADMIN — ACYCLOVIR SODIUM SCH MG: 50 INJECTION, SOLUTION INTRAVENOUS at 05:34

## 2017-03-11 RX ADMIN — LISINOPRIL SCH MG: 10 TABLET ORAL at 14:29

## 2017-03-11 RX ADMIN — Medication SCH ML: at 06:49

## 2017-03-11 RX ADMIN — CEFTRIAXONE SCH ML: 2 INJECTION, SOLUTION INTRAVENOUS at 01:00

## 2017-03-11 RX ADMIN — VANCOMYCIN HYDROCHLORIDE SCH MG: 1 INJECTION, POWDER, LYOPHILIZED, FOR SOLUTION INTRAVENOUS at 06:50

## 2017-03-11 RX ADMIN — ACETAMINOPHEN PRN MG: 325 TABLET ORAL at 03:04

## 2017-03-11 RX ADMIN — INSULIN LISPRO PRN UNIT: 100 INJECTION, SOLUTION INTRAVENOUS; SUBCUTANEOUS at 21:56

## 2017-03-11 RX ADMIN — METFORMIN HYDROCHLORIDE SCH MG: 500 TABLET, FILM COATED ORAL at 08:43

## 2017-03-11 RX ADMIN — AMPICILLIN SODIUM AND SULBACTAM SODIUM SCH GM: 1; .5 INJECTION, POWDER, FOR SOLUTION INTRAMUSCULAR; INTRAVENOUS at 08:43

## 2017-03-11 RX ADMIN — INSULIN LISPRO PRN UNIT: 100 INJECTION, SOLUTION INTRAVENOUS; SUBCUTANEOUS at 17:45

## 2017-03-11 RX ADMIN — MAGNESIUM SULFATE IN DEXTROSE SCH ML: 10 INJECTION, SOLUTION INTRAVENOUS at 11:55

## 2017-03-11 RX ADMIN — AMPICILLIN SODIUM AND SULBACTAM SODIUM SCH GM: 1; .5 INJECTION, POWDER, FOR SOLUTION INTRAMUSCULAR; INTRAVENOUS at 02:59

## 2017-03-11 RX ADMIN — Medication SCH ML: at 21:51

## 2017-03-11 RX ADMIN — Medication SCH ML: at 14:27

## 2017-03-11 NOTE — PDOC PROGRESS REPORT
Subjective


Progress Note for:: 03/11/17





Physical Exam


Vital Signs: 


 











Temp Pulse Resp BP Pulse Ox


 


 98.5 F   75   20   123/64   97 


 


 03/11/17 11:46  03/11/17 11:46  03/11/17 11:46  03/11/17 11:46  03/11/17 11:46








 Intake & Output











 03/10/17 03/11/17 03/12/17





 06:59 06:59 07:59


 


Intake Total 2475 3970 


 


Output Total 4025 1550 


 


Balance -1550 2420 


 


Weight 56.2 kg 57.3 kg 














Results


Laboratory Results: 


 





 03/11/17 04:17 





 03/11/17 04:17 





 











  03/11/17 03/11/17





  04:17 04:17


 


WBC  8.0 


 


RBC  4.02 


 


Hgb  11.1 L 


 


Hct  32.6 L 


 


MCV  81 


 


MCH  27.6 


 


MCHC  34.1 


 


RDW  15.5 H 


 


Plt Count  158 


 


Seg Neutrophils %  51.9 


 


Lymphocytes %  31.6 


 


Monocytes %  9.1 


 


Eosinophils %  6.2 H 


 


Basophils %  1.2 


 


Absolute Neutrophils  4.1 


 


Absolute Lymphocytes  2.5 


 


Absolute Monocytes  0.7 


 


Absolute Eosinophils  0.5 


 


Absolute Basophils  0.1 


 


Sodium   139.9


 


Potassium   3.5 L D


 


Chloride   102


 


Carbon Dioxide   26


 


Anion Gap   12


 


BUN   8


 


Creatinine   0.47 L


 


Est GFR ( Amer)   > 60


 


Est GFR (Non-Af Amer)   > 60


 


Glucose   199 H


 


Calcium   9.2


 


Total Bilirubin   1.6 H


 


AST   19


 


ALT   30


 


Alkaline Phosphatase   78


 


Total Protein   6.6


 


Albumin   3.6








 











  03/09/17





  04:15


 


Troponin I  < 0.012











Impressions: 


 





Chest X-Ray  03/08/17 18:01


IMPRESSION:  LOW LUNG VOLUMES.  NO SIGNIFICANT RADIOGRAPHIC FINDING IN THE 

CHEST.


 








Head CT  03/08/17 18:01


IMPRESSION:  CHRONIC CHANGES OF ATROPHY AND MICROVASCULAR ISCHEMIA.  NO ACUTE 

PROCESS.


 








Abdomen/Pelvis CT  03/09/17 00:00


IMPRESSION:  Gallstone.


L1 compression fracture age indeterminate.


 








Chest CT  03/09/17 00:00


IMPRESSION:  Dependent atelectasis.


1.7 cm mass in the thyroid gland on the right.


 








Guidance Fluoroscopy  03/09/17 00:00


IMPRESSION:  Lumbar puncture under fluoroscopy. No immediate complication.   

Very limited low volume tap, 1.5 mL of fluid was obtained.


 








Thyroid Ultrasound  03/09/17 00:00


IMPRESSION:  Left and right lobe 2 cm complex solid-cystic nodules with 

internal blood flow demonstrated on color Doppler interrogation.


 








Lumbar Puncture  03/09/17 02:42


IMPRESSION:  Lumbar puncture under fluoroscopy. No immediate complication.   

Very limited low volume tap, 1.5 mL of fluid was obtained.


 








Abdomen Ultrasound  03/09/17 08:15


IMPRESSION:  Somewhat limited study as noted above.  Gallstones are identified.

  Other findings as noted above


 








Foot X-Ray  03/10/17 00:00


IMPRESSION:  No significant findings.


 














Assessment & Plan





- Plan Summary


Plan Summary: 


Right Great toe 


 s/p debridement of necrotic wound , wound is better


Continue wound care

## 2017-03-11 NOTE — PDOC PROGRESS REPORT
Subjective


Progress Note for:: 03/11/17


Subjective:: 


I have seen patient in presence physical therapy.  She was working at the edge 

of bed at the time of my evaluation.  I cannot obtain history or review of 

systems secondary to communication barriers and  services have been 

unable to interpret patient native language.





Physical Exam


Vital Signs: 


 











Temp Pulse Resp BP Pulse Ox


 


 97.4 F   89   18   140/82 H  98 


 


 03/11/17 15:38  03/11/17 15:38  03/11/17 15:38  03/11/17 15:38  03/11/17 15:38








 Intake & Output











 03/10/17 03/11/17 03/12/17





 06:59 06:59 07:59


 


Intake Total 2475 3970 713


 


Output Total 4025 1550 


 


Balance -1550 2420 713


 


Weight 56.2 kg 57.3 kg 








GENERAL: No acute distress


HEENT: Conjunctiva clear, nonicteric, moist mucous membranes, no JVD, midline 

trachea


RESPIRATORY: Clear to auscultation bilaterally, no wheezes, no rhonchi


CARDIAC: Regular rate and rhythm, no murmurs/gallops/rubs


ABDOMEN: Soft, nondistended, nontender, positive bowel sounds, no rebound, no 

guarding


EXTREMETIES: No edema, cyanosis, clubbing


NEUROLOGIC: Alert, oriented x 3, CN's grossly intact,  no focal deficits


SKIN: Dressing clean/dry/intact to right first toe


PSYCH: Normal mood, normal affect








Results


Laboratory Results: 


 





 03/11/17 04:17 





 03/11/17 04:17 





 











  03/11/17 03/11/17





  04:17 04:17


 


WBC  8.0 


 


RBC  4.02 


 


Hgb  11.1 L 


 


Hct  32.6 L 


 


MCV  81 


 


MCH  27.6 


 


MCHC  34.1 


 


RDW  15.5 H 


 


Plt Count  158 


 


Seg Neutrophils %  51.9 


 


Lymphocytes %  31.6 


 


Monocytes %  9.1 


 


Eosinophils %  6.2 H 


 


Basophils %  1.2 


 


Absolute Neutrophils  4.1 


 


Absolute Lymphocytes  2.5 


 


Absolute Monocytes  0.7 


 


Absolute Eosinophils  0.5 


 


Absolute Basophils  0.1 


 


Sodium   139.9


 


Potassium   3.5 L D


 


Chloride   102


 


Carbon Dioxide   26


 


Anion Gap   12


 


BUN   8


 


Creatinine   0.47 L


 


Est GFR ( Amer)   > 60


 


Est GFR (Non-Af Amer)   > 60


 


Glucose   199 H


 


Calcium   9.2


 


Total Bilirubin   1.6 H


 


AST   19


 


ALT   30


 


Alkaline Phosphatase   78


 


Total Protein   6.6


 


Albumin   3.6








 











  03/09/17





  04:15


 


Troponin I  < 0.012











Impressions: 


 





Chest X-Ray  03/08/17 18:01


IMPRESSION:  LOW LUNG VOLUMES.  NO SIGNIFICANT RADIOGRAPHIC FINDING IN THE 

CHEST.


 








Head CT  03/08/17 18:01


IMPRESSION:  CHRONIC CHANGES OF ATROPHY AND MICROVASCULAR ISCHEMIA.  NO ACUTE 

PROCESS.


 








Abdomen/Pelvis CT  03/09/17 00:00


IMPRESSION:  Gallstone.


L1 compression fracture age indeterminate.


 








Chest CT  03/09/17 00:00


IMPRESSION:  Dependent atelectasis.


1.7 cm mass in the thyroid gland on the right.


 








Guidance Fluoroscopy  03/09/17 00:00


IMPRESSION:  Lumbar puncture under fluoroscopy. No immediate complication.   

Very limited low volume tap, 1.5 mL of fluid was obtained.


 








Thyroid Ultrasound  03/09/17 00:00


IMPRESSION:  Left and right lobe 2 cm complex solid-cystic nodules with 

internal blood flow demonstrated on color Doppler interrogation.


 








Lumbar Puncture  03/09/17 02:42


IMPRESSION:  Lumbar puncture under fluoroscopy. No immediate complication.   

Very limited low volume tap, 1.5 mL of fluid was obtained.


 








Abdomen Ultrasound  03/09/17 08:15


IMPRESSION:  Somewhat limited study as noted above.  Gallstones are identified.

  Other findings as noted above


 








Foot X-Ray  03/10/17 00:00


IMPRESSION:  No significant findings.


 














Assessment & Plan





- Diagnosis


(1) Sepsis





Is this a current diagnosis for this admission?: YesPlan: 








Likely due to DM right foot infection.  





Influenza screen negative.  Patient has cholelithiasis noted on CT scan of the 

abdomen and pelvis.  Right upper quadrant ultrasound with no acute process.  

Lumbar puncture does not have any definite evidence of CNS infection, but 

culture pending.  CT scan of the chest is negative for pneumonia.  Urinalysis 

is negative.





Discontinue IV antibiotics.  Start oral doxycycline.








(2) Diabetic foot ulcer


Qualifiers: 


     Diabetic foot ulcer location: toe     Diabetes mellitus type: type 2     

Laterality: right     Non-pressure ulcer stage: unspecified non-pressure ulcer 

stage        Qualified Code(s): E11.621 - Type 2 diabetes mellitus with foot 

ulcer; L97.509 - Non-pressure chronic ulcer of other part of unspecified foot 

with unspecified severity  


Is this a current diagnosis for this admission?: YesPlan: 


Patient is status post incision and drainage by surgery on 03/10/2017.  

Continue local wound care.








(3) New onset type 2 diabetes mellitus


Is this a current diagnosis for this admission?: YesPlan: 








Continue sliding scale insulin coverage. Started metformin 500mg BID. Will need 

to arrange local PCP after discharge.








(4) Cholelithiasis


Qualifiers: 


     Cholelithiasis location: gallbladder     Cholecystitis presence: without 

cholecystitis


Is this a current diagnosis for this admission?: YesPlan: 





No infection.








(5) Hypokalemia


Is this a current diagnosis for this admission?: Yes





(6) Hypomagnesemia


Is this a current diagnosis for this admission?: Yes





(7) Thyroid nodule


Is this a current diagnosis for this admission?: YesPlan: 





Thyroid Ultrasound  03/09/17 00:00


IMPRESSION:  Left and right lobe 2 cm complex solid-cystic nodules with 

internal blood flow demonstrated on color Doppler interrogation.





This will need further outpatient workup.











- Time


Time Spent with patient: 25-34 minutes


Anticipated discharge: Home


Within: within 48 hours

## 2017-03-12 LAB
ANION GAP SERPL CALC-SCNC: 10 MMOL/L (ref 5–19)
BASOPHILS # BLD AUTO: 0.1 10^3/UL (ref 0–0.2)
BASOPHILS NFR BLD AUTO: 0.9 % (ref 0–2)
BUN SERPL-MCNC: 8 MG/DL (ref 7–20)
CALCIUM: 9.6 MG/DL (ref 8.4–10.2)
CHLORIDE SERPL-SCNC: 100 MMOL/L (ref 98–107)
CO2 SERPL-SCNC: 27 MMOL/L (ref 22–30)
CREAT SERPL-MCNC: 0.43 MG/DL (ref 0.52–1.25)
EOSINOPHIL # BLD AUTO: 0.5 10^3/UL (ref 0–0.6)
EOSINOPHIL NFR BLD AUTO: 6.3 % (ref 0–6)
ERYTHROCYTE [DISTWIDTH] IN BLOOD BY AUTOMATED COUNT: 15.7 % (ref 11.5–14)
GLUCOSE SERPL-MCNC: 202 MG/DL (ref 75–110)
HCT VFR BLD CALC: 31.1 % (ref 36–47)
HGB BLD-MCNC: 10.8 G/DL (ref 12–15.5)
HGB HCT DIFFERENCE: 1.3
LYMPHOCYTES # BLD AUTO: 2.9 10^3/UL (ref 0.5–4.7)
LYMPHOCYTES NFR BLD AUTO: 35.9 % (ref 13–45)
MCH RBC QN AUTO: 27.8 PG (ref 27–33.4)
MCHC RBC AUTO-ENTMCNC: 34.9 G/DL (ref 32–36)
MCV RBC AUTO: 80 FL (ref 80–97)
MONOCYTES # BLD AUTO: 0.8 10^3/UL (ref 0.1–1.4)
MONOCYTES NFR BLD AUTO: 9.8 % (ref 3–13)
NEUTROPHILS # BLD AUTO: 3.9 10^3/UL (ref 1.7–8.2)
NEUTS SEG NFR BLD AUTO: 47.1 % (ref 42–78)
POTASSIUM SERPL-SCNC: 3.4 MMOL/L (ref 3.6–5)
RBC # BLD AUTO: 3.9 10^6/UL (ref 3.72–5.28)
SODIUM SERPL-SCNC: 137 MMOL/L (ref 137–145)
WBC # BLD AUTO: 8.2 10^3/UL (ref 4–10.5)

## 2017-03-12 RX ADMIN — DOXYCYCLINE HYCLATE SCH MG: 100 TABLET ORAL at 23:07

## 2017-03-12 RX ADMIN — METFORMIN HYDROCHLORIDE SCH MG: 500 TABLET, FILM COATED ORAL at 16:56

## 2017-03-12 RX ADMIN — GLIPIZIDE SCH MG: 5 TABLET, FILM COATED, EXTENDED RELEASE ORAL at 10:14

## 2017-03-12 RX ADMIN — Medication SCH ML: at 16:58

## 2017-03-12 RX ADMIN — TRAMADOL HYDROCHLORIDE SCH MG: 50 TABLET, FILM COATED ORAL at 23:07

## 2017-03-12 RX ADMIN — METFORMIN HYDROCHLORIDE SCH MG: 500 TABLET, FILM COATED ORAL at 10:15

## 2017-03-12 RX ADMIN — TRAMADOL HYDROCHLORIDE SCH MG: 50 TABLET, FILM COATED ORAL at 18:19

## 2017-03-12 RX ADMIN — Medication SCH ML: at 23:09

## 2017-03-12 RX ADMIN — TRAMADOL HYDROCHLORIDE PRN MG: 50 TABLET, FILM COATED ORAL at 10:14

## 2017-03-12 RX ADMIN — Medication SCH ML: at 06:06

## 2017-03-12 RX ADMIN — LISINOPRIL SCH MG: 10 TABLET ORAL at 16:57

## 2017-03-12 RX ADMIN — DOXYCYCLINE HYCLATE SCH MG: 100 TABLET ORAL at 10:15

## 2017-03-12 RX ADMIN — TRAMADOL HYDROCHLORIDE PRN MG: 50 TABLET, FILM COATED ORAL at 16:55

## 2017-03-12 NOTE — PDOC PROGRESS REPORT
Physical Exam


Vital Signs: 


 











Temp Pulse Resp BP Pulse Ox


 


 98.8 F   88   16   123/71   98 


 


 03/12/17 15:24  03/12/17 15:24  03/12/17 15:24  03/12/17 15:24  03/12/17 15:24








 Intake & Output











 03/11/17 03/12/17 03/13/17





 05:59 06:59 06:59


 


Intake Total   1040


 


Output Total   


 


Balance   1040


 


Weight   











Extremities exam: PRESENT: other - Right great toe - wound clean continue 

dressings will follow PRN





Results


Laboratory Results: 


 





 03/12/17 05:48 





 03/12/17 05:48 





 











  03/12/17 03/12/17





  05:48 05:48


 


WBC  8.2 


 


RBC  3.90 


 


Hgb  10.8 L 


 


Hct  31.1 L 


 


MCV  80 


 


MCH  27.8 


 


MCHC  34.9 


 


RDW  15.7 H 


 


Plt Count  157 


 


Seg Neutrophils %  47.1 


 


Lymphocytes %  35.9 


 


Monocytes %  9.8 


 


Eosinophils %  6.3 H 


 


Basophils %  0.9 


 


Absolute Neutrophils  3.9 


 


Absolute Lymphocytes  2.9 


 


Absolute Monocytes  0.8 


 


Absolute Eosinophils  0.5 


 


Absolute Basophils  0.1 


 


Sodium   137.0


 


Potassium   3.4 L


 


Chloride   100


 


Carbon Dioxide   27


 


Anion Gap   10


 


BUN   8


 


Creatinine   0.43 L


 


Est GFR ( Amer)   > 60


 


Est GFR (Non-Af Amer)   > 60


 


Glucose   202 H


 


Calcium   9.6








 





03/09/17 16:15   Cerebral Spinal Fluid - Tube 1 (Csf)   Gram Stain - Final


03/09/17 16:15   Cerebral Spinal Fluid - Tube 1 (Csf)   CSF Culture - Final


                            NO GROWTH 3 DAYS





 











  03/09/17





  04:15


 


Troponin I  < 0.012











Impressions: 


 





Chest X-Ray  03/08/17 18:01


IMPRESSION:  LOW LUNG VOLUMES.  NO SIGNIFICANT RADIOGRAPHIC FINDING IN THE 

CHEST.


 








Head CT  03/08/17 18:01


IMPRESSION:  CHRONIC CHANGES OF ATROPHY AND MICROVASCULAR ISCHEMIA.  NO ACUTE 

PROCESS.


 








Abdomen/Pelvis CT  03/09/17 00:00


IMPRESSION:  Gallstone.


L1 compression fracture age indeterminate.


 








Chest CT  03/09/17 00:00


IMPRESSION:  Dependent atelectasis.


1.7 cm mass in the thyroid gland on the right.


 








Guidance Fluoroscopy  03/09/17 00:00


IMPRESSION:  Lumbar puncture under fluoroscopy. No immediate complication.   

Very limited low volume tap, 1.5 mL of fluid was obtained.


 








Thyroid Ultrasound  03/09/17 00:00


IMPRESSION:  Left and right lobe 2 cm complex solid-cystic nodules with 

internal blood flow demonstrated on color Doppler interrogation.


 








Lumbar Puncture  03/09/17 02:42


IMPRESSION:  Lumbar puncture under fluoroscopy. No immediate complication.   

Very limited low volume tap, 1.5 mL of fluid was obtained.


 








Abdomen Ultrasound  03/09/17 08:15


IMPRESSION:  Somewhat limited study as noted above.  Gallstones are identified.

  Other findings as noted above


 








Foot X-Ray  03/10/17 00:00


IMPRESSION:  No significant findings.

## 2017-03-12 NOTE — PDOC PROGRESS REPORT
Subjective


Progress Note for:: 03/12/17


Subjective:: 


Patient is seen with her son present in the room today.  He is able to 

interpret.  Patient has been having pain from her right foot.  She is eating 

and drinking well. Patient denies fever, chills, headache, new focal weakness, 

chest pain, shortness of breath, abdominal pain, nausea, vomiting, diarrhea, 

constipation.





Physical Exam


Vital Signs: 


 











Temp Pulse Resp BP Pulse Ox


 


 98.8 F   88   16   123/71   98 


 


 03/12/17 15:24  03/12/17 15:24  03/12/17 15:24  03/12/17 15:24  03/12/17 15:24








 Intake & Output











 03/11/17 03/12/17 03/13/17





 05:59 06:59 06:59


 


Intake Total   1040


 


Output Total   


 


Balance   1040


 


Weight   








GENERAL: No acute distress


HEENT: Conjunctiva clear, nonicteric, moist mucous membranes, no JVD, midline 

trachea


RESPIRATORY: Clear to auscultation bilaterally, no wheezes, no rhonchi


CARDIAC: Regular rate and rhythm, no murmurs/gallops/rubs


ABDOMEN: Soft, nondistended, nontender, positive bowel sounds, no rebound, no 

guarding


EXTREMETIES: No edema, cyanosis, clubbing


NEUROLOGIC: Alert, oriented x 3, CN's grossly intact,  no focal deficits


SKIN: Dressing clean/dry/intact to right first toe


PSYCH: Normal mood, normal affect





Results


Laboratory Results: 


 





 03/12/17 05:48 





 03/12/17 05:48 





 











  03/12/17 03/12/17





  05:48 05:48


 


WBC  8.2 


 


RBC  3.90 


 


Hgb  10.8 L 


 


Hct  31.1 L 


 


MCV  80 


 


MCH  27.8 


 


MCHC  34.9 


 


RDW  15.7 H 


 


Plt Count  157 


 


Seg Neutrophils %  47.1 


 


Lymphocytes %  35.9 


 


Monocytes %  9.8 


 


Eosinophils %  6.3 H 


 


Basophils %  0.9 


 


Absolute Neutrophils  3.9 


 


Absolute Lymphocytes  2.9 


 


Absolute Monocytes  0.8 


 


Absolute Eosinophils  0.5 


 


Absolute Basophils  0.1 


 


Sodium   137.0


 


Potassium   3.4 L


 


Chloride   100


 


Carbon Dioxide   27


 


Anion Gap   10


 


BUN   8


 


Creatinine   0.43 L


 


Est GFR ( Amer)   > 60


 


Est GFR (Non-Af Amer)   > 60


 


Glucose   202 H


 


Calcium   9.6








 





03/09/17 16:15   Cerebral Spinal Fluid - Tube 1 (Csf)   Gram Stain - Final


03/09/17 16:15   Cerebral Spinal Fluid - Tube 1 (Csf)   CSF Culture - Final


                            NO GROWTH 3 DAYS





 











  03/09/17





  04:15


 


Troponin I  < 0.012











Impressions: 


 





Chest X-Ray  03/08/17 18:01


IMPRESSION:  LOW LUNG VOLUMES.  NO SIGNIFICANT RADIOGRAPHIC FINDING IN THE 

CHEST.


 








Head CT  03/08/17 18:01


IMPRESSION:  CHRONIC CHANGES OF ATROPHY AND MICROVASCULAR ISCHEMIA.  NO ACUTE 

PROCESS.


 








Abdomen/Pelvis CT  03/09/17 00:00


IMPRESSION:  Gallstone.


L1 compression fracture age indeterminate.


 








Chest CT  03/09/17 00:00


IMPRESSION:  Dependent atelectasis.


1.7 cm mass in the thyroid gland on the right.


 








Guidance Fluoroscopy  03/09/17 00:00


IMPRESSION:  Lumbar puncture under fluoroscopy. No immediate complication.   

Very limited low volume tap, 1.5 mL of fluid was obtained.


 








Thyroid Ultrasound  03/09/17 00:00


IMPRESSION:  Left and right lobe 2 cm complex solid-cystic nodules with 

internal blood flow demonstrated on color Doppler interrogation.


 








Lumbar Puncture  03/09/17 02:42


IMPRESSION:  Lumbar puncture under fluoroscopy. No immediate complication.   

Very limited low volume tap, 1.5 mL of fluid was obtained.


 








Abdomen Ultrasound  03/09/17 08:15


IMPRESSION:  Somewhat limited study as noted above.  Gallstones are identified.

  Other findings as noted above


 








Foot X-Ray  03/10/17 00:00


IMPRESSION:  No significant findings.


 














Assessment & Plan





- Diagnosis


(1) Sepsis





Is this a current diagnosis for this admission?: YesPlan: 











Likely due to DM right foot infection.  





Influenza screen negative.  Patient has cholelithiasis noted on CT scan of the 

abdomen and pelvis.  Right upper quadrant ultrasound with no acute process.  

Lumbar puncture does not have any definite evidence of CNS infection, but 

culture pending.  CT scan of the chest is negative for pneumonia.  Urinalysis 

is negative.





Continue oral doxycycline.








(2) Diabetic foot ulcer


Qualifiers: 


     Diabetic foot ulcer location: toe     Diabetes mellitus type: type 2     

Laterality: right     Non-pressure ulcer stage: unspecified non-pressure ulcer 

stage        Qualified Code(s): E11.621 - Type 2 diabetes mellitus with foot 

ulcer; L97.509 - Non-pressure chronic ulcer of other part of unspecified foot 

with unspecified severity  


Is this a current diagnosis for this admission?: YesPlan: 





Patient is status post incision and drainage by surgery on 03/10/2017.  

Continue local wound care.





Change tramadol to scheduled as patient is having difficulty asking for when 

necessary medication secondary to language barrier.








(3) New onset type 2 diabetes mellitus


Is this a current diagnosis for this admission?: YesPlan: 











Continue sliding scale insulin coverage. Started metformin 500mg BID.  Start 

Glucotrol XL 5 mg daily.  Will need to arrange local PCP after discharge.





Hemoglobin A1c 9.4.








(4) Cholelithiasis


Qualifiers: 


     Cholelithiasis location: gallbladder     Cholecystitis presence: without 

cholecystitis


Is this a current diagnosis for this admission?: YesPlan: 








No infection.  Elective outpatient surgical consult.








(5) Hypokalemia


Is this a current diagnosis for this admission?: YesPlan: 





Replace as needed.  








(6) Hypomagnesemia


Is this a current diagnosis for this admission?: Yes





(7) Thyroid nodule


Is this a current diagnosis for this admission?: YesPlan: 





Thyroid Ultrasound  03/09/17 00:00


IMPRESSION:  Left and right lobe 2 cm complex solid-cystic nodules with 

internal blood flow demonstrated on color Doppler interrogation.





This will need further outpatient workup.








(8) Hypertension





Is this a current diagnosis for this admission?: YesPlan: 


Continue lisinopril 10 mg daily.











- Time


Time Spent with patient: 25-34 minutes

## 2017-03-13 VITALS — SYSTOLIC BLOOD PRESSURE: 108 MMHG | DIASTOLIC BLOOD PRESSURE: 65 MMHG

## 2017-03-13 RX ADMIN — DOXYCYCLINE HYCLATE SCH MG: 100 TABLET ORAL at 10:52

## 2017-03-13 RX ADMIN — METFORMIN HYDROCHLORIDE SCH MG: 500 TABLET, FILM COATED ORAL at 10:53

## 2017-03-13 RX ADMIN — GLIPIZIDE SCH MG: 5 TABLET, FILM COATED, EXTENDED RELEASE ORAL at 10:53

## 2017-03-13 RX ADMIN — TRAMADOL HYDROCHLORIDE SCH MG: 50 TABLET, FILM COATED ORAL at 06:05

## 2017-03-13 RX ADMIN — Medication SCH ML: at 06:06

## 2017-03-13 NOTE — PDOC DISCHARGE SUMMARY
General





- Admit/Disc Date/PCP


Admission Date/Primary Care Provider: 


  03/09/17 02:47





  





Discharge Date: 03/13/17





- Discharge Diagnosis


(1) Sepsis


Is this a current diagnosis for this admission?: Yes





(2) Diabetic foot ulcer


Is this a current diagnosis for this admission?: Yes





(3) New onset type 2 diabetes mellitus


Is this a current diagnosis for this admission?: Yes





(4) Cholelithiasis


Is this a current diagnosis for this admission?: Yes





(5) Hypokalemia


Is this a current diagnosis for this admission?: Yes





(6) Hypomagnesemia


Is this a current diagnosis for this admission?: Yes





(7) Thyroid nodule


Is this a current diagnosis for this admission?: Yes





(8) Hypertension


Is this a current diagnosis for this admission?: Yes








- Additional Information


Resuscitation Status: Full Code


Discharge Diet: Cardiac, Diabetic


Discharge Activity: Activity As Tolerated


Home Medications: 








Acetaminophen [Tylenol 325 mg Tablet] 650 mg PO Q4HP PRN  tablet 03/13/17 


Doxycycline Hyclate [Vibramycin 100 mg Tablet] 100 mg PO Q12 #20 tablet 03/13/ 17 


Glipizide [Glucotrol Xl 5 mg Tab.er] 5 mg PO DAILY #30 tab.er.24 03/13/17 


Lisinopril [Prinivil 10 mg Tablet] 10 mg PO DAILY@1500 #30 tablet 03/13/17 


Metformin HCl 500 mg PO BID #60 tablet 03/13/17 


Tramadol HCl [Ultram 50 mg Tablet] 50 mg PO Q8HP PRN #20 tablet 03/13/17 











History of Present Illness


Patient complains of: Fever, altered mental status


History of Present Illness: 


JESUS SANDOVAL is a 81 year old known  female resident of [facility]

, having moved to this country in [place] to be with her son, who presents to 

the emergency room for evaluation of above.








Hospital Course


Hospital Course: 


Patient was admitted for fever and altered mental status.  Extensive workup was 

unremarkable.  Patient had obvious infection of her right first toe likely 

associated with new onset diabetes.  She was evaluated by surgery and had 

incision and drainage.  She was placed initially on IV antibiotics.  She has 

not been transitioned to oral doxycycline has remained stable.  Her blood count 

has normalized.  Patient is afebrile.  Mental status returned baseline.





With respect to new-onset diabetes she was started on metformin and Glucotrol 

XL.  Blood glucose is stable at discharge.  She is to check Accu-Cheks twice 

daily and report to primary care provider.  Patient did not have a primary care 

provider as she has recently moved here from [facility] to live with her son.  We 

will arrange follow-up at [facility] clinic.





Cholelithiasis, this was noted on imaging.  She has no evidence of acute 

infection.  This can be managed as an outpatient electively.





Thyroid nodule, this was noted on CT scan and subsequently on thyroid 

ultrasound.  This will need outpatient surgical opinion.





Physical Exam


Vital Signs: 


 











Temp Pulse Resp BP Pulse Ox


 


 98.0 F   88   16   108/65   100 


 


 03/13/17 10:41  03/13/17 10:41  03/13/17 10:41  03/13/17 10:41  03/13/17 10:41








 Intake & Output











 03/12/17 03/13/17 03/14/17





 06:59 06:59 06:59


 


Intake Total  1290 


 


Output Total  490 


 


Balance  800 


 


Weight   








GENERAL: No acute distress


HEENT: Conjunctiva clear, nonicteric, moist mucous membranes, no JVD, midline 

trachea


RESPIRATORY: Clear to auscultation bilaterally, no wheezes, no rhonchi


CARDIAC: Regular rate and rhythm, no murmurs/gallops/rubs


ABDOMEN: Soft, nondistended, nontender, positive bowel sounds, no rebound, no 

guarding


EXTREMETIES: No edema, cyanosis, clubbing


NEUROLOGIC: Alert, oriented x 3, CN's grossly intact,  no focal deficits


SKIN: Dressing clean/dry/intact to right first toe


PSYCH: Normal mood, normal affect





Results


Laboratory Results: 


 





 03/12/17 05:48 





 03/12/17 05:48 





 











  03/09/17





  04:15


 


Troponin I  < 0.012











Impressions: 


 





Chest X-Ray  03/08/17 18:01


IMPRESSION:  LOW LUNG VOLUMES.  NO SIGNIFICANT RADIOGRAPHIC FINDING IN THE 

CHEST.


 








Head CT  03/08/17 18:01


IMPRESSION:  CHRONIC CHANGES OF ATROPHY AND MICROVASCULAR ISCHEMIA.  NO ACUTE 

PROCESS.


 








Abdomen/Pelvis CT  03/09/17 00:00


IMPRESSION:  Gallstone.


L1 compression fracture age indeterminate.


 








Chest CT  03/09/17 00:00


IMPRESSION:  Dependent atelectasis.


1.7 cm mass in the thyroid gland on the right.


 








Guidance Fluoroscopy  03/09/17 00:00


IMPRESSION:  Lumbar puncture under fluoroscopy. No immediate complication.   

Very limited low volume tap, 1.5 mL of fluid was obtained.


 








Thyroid Ultrasound  03/09/17 00:00


IMPRESSION:  Left and right lobe 2 cm complex solid-cystic nodules with 

internal blood flow demonstrated on color Doppler interrogation.


 








Lumbar Puncture  03/09/17 02:42


IMPRESSION:  Lumbar puncture under fluoroscopy. No immediate complication.   

Very limited low volume tap, 1.5 mL of fluid was obtained.


 








Abdomen Ultrasound  03/09/17 08:15


IMPRESSION:  Somewhat limited study as noted above.  Gallstones are identified.

  Other findings as noted above


 








Foot X-Ray  03/10/17 00:00


IMPRESSION:  No significant findings.


 














Qualifiers


**PATEINT BEING DISCHARGED WITH ANY OF THE FOLLOWING DIAGNOSIS?: No





Plan


Time Spent: Less than 30 Minutes

## 2017-04-10 ENCOUNTER — HOSPITAL ENCOUNTER (EMERGENCY)
Dept: HOSPITAL 62 - ER | Age: 82
LOS: 1 days | Discharge: HOME | End: 2017-04-11
Payer: SELF-PAY

## 2017-04-10 DIAGNOSIS — R53.1: ICD-10-CM

## 2017-04-10 DIAGNOSIS — R25.3: Primary | ICD-10-CM

## 2017-04-10 LAB
ALBUMIN SERPL-MCNC: 4.7 G/DL (ref 3.5–5)
ALP SERPL-CCNC: 75 U/L (ref 38–126)
ALT SERPL-CCNC: 29 U/L (ref 9–52)
ANION GAP SERPL CALC-SCNC: 15 MMOL/L (ref 5–19)
APPEARANCE UR: CLEAR
AST SERPL-CCNC: 23 U/L (ref 14–36)
BASOPHILS # BLD AUTO: 0.1 10^3/UL (ref 0–0.2)
BASOPHILS NFR BLD AUTO: 1.1 % (ref 0–2)
BILIRUB DIRECT SERPL-MCNC: 0.2 MG/DL (ref 0–0.4)
BILIRUB SERPL-MCNC: 1.9 MG/DL (ref 0.2–1.3)
BILIRUB UR QL STRIP: NEGATIVE
BUN SERPL-MCNC: 7 MG/DL (ref 7–20)
CALCIUM: 10 MG/DL (ref 8.4–10.2)
CHLORIDE SERPL-SCNC: 102 MMOL/L (ref 98–107)
CO2 SERPL-SCNC: 27 MMOL/L (ref 22–30)
CREAT SERPL-MCNC: 0.49 MG/DL (ref 0.52–1.25)
EOSINOPHIL # BLD AUTO: 0.2 10^3/UL (ref 0–0.6)
EOSINOPHIL NFR BLD AUTO: 2.3 % (ref 0–6)
ERYTHROCYTE [DISTWIDTH] IN BLOOD BY AUTOMATED COUNT: 17.7 % (ref 11.5–14)
GLUCOSE SERPL-MCNC: 137 MG/DL (ref 75–110)
GLUCOSE UR STRIP-MCNC: NEGATIVE MG/DL
HCT VFR BLD CALC: 36.3 % (ref 36–47)
HGB BLD-MCNC: 12.6 G/DL (ref 12–15.5)
HGB HCT DIFFERENCE: 1.5
KETONES UR STRIP-MCNC: NEGATIVE MG/DL
LYMPHOCYTES # BLD AUTO: 2.8 10^3/UL (ref 0.5–4.7)
LYMPHOCYTES NFR BLD AUTO: 31.3 % (ref 13–45)
MCH RBC QN AUTO: 27.6 PG (ref 27–33.4)
MCHC RBC AUTO-ENTMCNC: 34.6 G/DL (ref 32–36)
MCV RBC AUTO: 80 FL (ref 80–97)
MONOCYTES # BLD AUTO: 0.7 10^3/UL (ref 0.1–1.4)
MONOCYTES NFR BLD AUTO: 7.5 % (ref 3–13)
NEUTROPHILS # BLD AUTO: 5.2 10^3/UL (ref 1.7–8.2)
NEUTS SEG NFR BLD AUTO: 57.8 % (ref 42–78)
NITRITE UR QL STRIP: NEGATIVE
PH UR STRIP: 7 [PH] (ref 5–9)
POTASSIUM SERPL-SCNC: 4.2 MMOL/L (ref 3.6–5)
PROT SERPL-MCNC: 7.9 G/DL (ref 6.3–8.2)
PROT UR STRIP-MCNC: NEGATIVE MG/DL
RBC # BLD AUTO: 4.55 10^6/UL (ref 3.72–5.28)
SODIUM SERPL-SCNC: 144.3 MMOL/L (ref 137–145)
SP GR UR STRIP: 1
UROBILINOGEN UR-MCNC: NEGATIVE MG/DL (ref ?–2)
WBC # BLD AUTO: 8.9 10^3/UL (ref 4–10.5)

## 2017-04-10 PROCEDURE — 99285 EMERGENCY DEPT VISIT HI MDM: CPT

## 2017-04-10 PROCEDURE — 36415 COLL VENOUS BLD VENIPUNCTURE: CPT

## 2017-04-10 PROCEDURE — 96374 THER/PROPH/DIAG INJ IV PUSH: CPT

## 2017-04-10 PROCEDURE — 81001 URINALYSIS AUTO W/SCOPE: CPT

## 2017-04-10 PROCEDURE — 85025 COMPLETE CBC W/AUTO DIFF WBC: CPT

## 2017-04-10 PROCEDURE — 70450 CT HEAD/BRAIN W/O DYE: CPT

## 2017-04-10 PROCEDURE — 80053 COMPREHEN METABOLIC PANEL: CPT

## 2017-04-10 NOTE — ER DOCUMENT REPORT
ED Medical Screen (RME)





- General


Chief Complaint: Weakness


Stated Complaint: WEAKNESS


Notes: 


Patient has been exhibiting an uncontrolled movement of the left side of her 

body for the past week.  She is able to stand, but it's difficult to walk 

without assistance.  The patient indicates that this movement ceases when she 

is sleeping at night.  Patient has no history of any mental illness.  Is not on 

any mental medication such as Haldol or Thorazine.  She's not had a history of 

any head injury.  Patient is not from the [place] and does not speak very 

good English.  Denies headache.


Patient exhibits rather continuous athetoid movement of the left arm, left leg, 

and left face.  However, when she stands, there is very little such movements.  

Additionally, patient is able to walk without hardly any appearance of 

uncontrolled movements.





PMH: Hypertension, NIDDM.


TRAVEL OUTSIDE OF THE [place] IN LAST 30 DAYS: No





- Related Data


Allergies/Adverse Reactions: 


 





No Known Allergies Allergy (Unverified 03/10/17 19:24)


 











Past Medical History


Renal/ Medical History: Denies: Hx Peritoneal Dialysis





Physical Exam





- Vital signs


Vitals: 





 











Temp Pulse Resp BP Pulse Ox


 


 98.8 F   125 H  16   179/118 H  94 


 


 04/10/17 17:18  04/10/17 17:18  04/10/17 17:18  04/10/17 17:18  04/10/17 17:18














Course





- Vital Signs


Vital signs: 





 











Temp Pulse Resp BP Pulse Ox


 


 98.8 F   125 H  16   179/118 H  94 


 


 04/10/17 17:18  04/10/17 17:18  04/10/17 17:18  04/10/17 17:18  04/10/17 17:18

## 2017-04-10 NOTE — ER DOCUMENT REPORT
ED General





- General


Chief Complaint: Weakness


Stated Complaint: WEAKNESS


Time seen by provider: 20:05


Notes: 


Patient is a 81-year-old female that comes emergency department for chief 

complaint of left sided discomfort and constant movement, she was referred 

emergency department by Dr. Reardon for choreiform movements and further 

evaluation.  Patient speaks limited English, is from [place], has past medical 

history of type II diabetes and hypertension, states she's been taking 

medication for these, states she was also started on a new medication about 2 

weeks ago for "pain", they are unable to tell me the name of this medication.  

Patient has not had any other symptoms, is alert and oriented per her son, he 

denies any fevers, vomiting, or other complaints.  Patient reports to her son 

that she can sleep but while she is awake she is constantly moving.  This also 

seems to improve somewhat when she walks and she is able to get up.





TRAVEL OUTSIDE OF THE [place] IN LAST 30 DAYS: No





- Related Data


Allergies/Adverse Reactions: 


 





No Known Allergies Allergy (Unverified 03/10/17 19:24)


 











Past Medical History





- General


Information source: Patient, Relative - son





- Social History


Smoking Status: Never Smoker


Chew tobacco use (# tins/day): No


Frequency of alcohol use: None


Drug Abuse: None


Lives with: Family


Family History: Other - None known to son patient could not answer





- Past Medical History


Cardiac Medical History: Reports: Hx Hypertension


Endocrine Medical History: Reports: Hx Diabetes Mellitus Type 2


Renal/ Medical History: Denies: Hx Peritoneal Dialysis


Surgical Hx: Negative





Review of Systems





- Review of Systems


Constitutional: No symptoms reported


EENT: No symptoms reported


Cardiovascular: No symptoms reported


Respiratory: No symptoms reported


Gastrointestinal: No symptoms reported


Genitourinary: No symptoms reported


Female Genitourinary: No symptoms reported


Musculoskeletal: See HPI


Skin: No symptoms reported


Hematologic/Lymphatic: No symptoms reported


Neurological/Psychological: See HPI





Physical Exam





- Vital signs


Vitals: 


 











Temp Pulse Resp BP Pulse Ox


 


 98.8 F   125 H  16   179/118 H  94 


 


 04/10/17 17:18  04/10/17 17:18  04/10/17 17:18  04/10/17 17:18  04/10/17 17:18











Interpretation: Normal





- General


General appearance: Appears well, Alert


In distress: None - Patient does not appear to be in any pain or distress





- HEENT


Head: Normocephalic, Atraumatic


Eyes: Normal


Pupils: PERRL





- Respiratory


Respiratory status: No respiratory distress


Chest status: Nontender


Breath sounds: Normal


Chest palpation: Normal





- Cardiovascular


Rhythm: Regular, Tachycardia - Borderline


Heart sounds: Normal auscultation, S1 appreciated, S2 appreciated


Murmur: No





- Abdominal


Inspection: Normal


Distension: No distension


Bowel sounds: Normal


Tenderness: Nontender


Organomegaly: No organomegaly





- Back


Back: Normal, Nontender





- Extremities


General upper extremity: Other - Difficult to evaluate strength exam with 

patient having almost persistent mild arriving movement of the left upper 

extremity.  Normal distal neurovascular exam


General lower extremity: Other - Difficult to evaluate strength exam with 

patient having almost persistent mild arriving movement of the left lower 

extremity.  Normal distal neurovascular exam





- Neurological


Neuro grossly intact: Yes


Cognition: Normal.  No: Confused, Inattentive


Orientation: AAOx4.  No: Disoriented to person, Disoriented to place, 

Disoriented to time, Disoriented to events


[place] Coma Scale Eye Opening: Spontaneous


[place] Coma Scale Verbal: Oriented


[place] Coma Scale Motor: Obeys Commands


[place] Coma Scale Total: 15


Speech: Normal


Cranial nerves: Normal


Cerebellar coordination: Normal


Motor strength normal: LUE, RUE, LLE, RLE


Additional motor exam normals: Equal 


Sensory: Normal





- Psychological


Associated symptoms: Normal affect, Normal mood





- Skin


Skin Temperature: Warm


Skin Moisture: Dry


Skin Color: Normal





Course





- Re-evaluation


Re-evalutation: 


Patient with chewing motions and constant left arm and leg gyrations consistent 

with the appearance of a dystonic reaction.  Patient given 50 mg by mouth 

Benadryl by triage provider, this has not had time to give an effect, will 

monitor.





CT of the head with no acute findings, CBC, chemistry unremarkable, urinalysis 

pending.  Difficult to obtain vital signs because of patient's constant 

movement.  Borderline tachycardia on my examination.  Alert and cooperates with 

a normal neurological exam except for the writhing movements.





04/10/17 20:39


Writhing movements continued on reevaluation all have calm down somewhat, she 

is still in no distress, requesting something to help her rest/stop.  Given 

small dose of Ativan.  After this patient slept soundly.  Abnormal movements 

continued very slightly and intermittently while sleeping.  Vital signs 

normalized.





With normal workup, examination suggestive of dystonic reaction, slightly on 

presentation especially having symptoms for 1 week, however patient has 

continued to take her medication.  Recommended patient stop current medication 

for "pain", will place on Benadryl, I discussed the patient with Dr. Jade.

  Patient will follow-up with primary care and return for any concerning or 

worsening symptoms.





- Vital Signs


Vital signs: 


 











Temp Pulse Resp BP Pulse Ox


 


 98.8 F   106 H  18   139/77 H  97 


 


 04/10/17 17:18  04/10/17 20:21  04/10/17 20:21  04/11/17 02:01  04/11/17 02:01














- Laboratory


Result Diagrams: 


 04/10/17 18:39





 04/10/17 18:39


Laboratory results interpreted by me: 


 











  04/10/17 04/10/17





  18:39 18:39


 


RDW  17.7 H 


 


Creatinine   0.49 L


 


Glucose   137 H


 


Total Bilirubin   1.9 H














Discharge





- Discharge


Clinical Impression: 


 Muscle twitching





Condition: Stable


Disposition: HOME, SELF-CARE


Additional Instructions: 


Examination is suggestive of a dystonic reaction, this could be to the new 

medication she has, please stop this medications, give Benadryl as prescribed, 

follow-up within the next 1-2 days with primary care for additional management.

  I also recommend following up with the neurology referral especially if the 

symptoms continue.


Return the emergency department for confusion, fever, worsening symptoms etc.


Prescriptions: 


Diphenhydramine HCl 25 mg PO Q6 PRN #20 capsule


 PRN Reason: 


Referrals: 


SCOTT SELBY MD [ACTIVE STAFF] - Follow up in 3-5 days

## 2017-04-11 VITALS — DIASTOLIC BLOOD PRESSURE: 81 MMHG | SYSTOLIC BLOOD PRESSURE: 140 MMHG

## 2018-02-05 ENCOUNTER — HOSPITAL ENCOUNTER (OUTPATIENT)
Dept: HOSPITAL 62 - CCC | Age: 83
End: 2018-02-05
Payer: COMMERCIAL

## 2018-02-05 DIAGNOSIS — E11.8: ICD-10-CM

## 2018-02-05 DIAGNOSIS — I10: Primary | ICD-10-CM

## 2018-02-05 LAB
ALBUMIN SERPL-MCNC: 4.7 G/DL (ref 3.5–5)
ALP SERPL-CCNC: 77 U/L (ref 38–126)
ALT SERPL-CCNC: 23 U/L (ref 9–52)
ANION GAP SERPL CALC-SCNC: 13 MMOL/L (ref 5–19)
AST SERPL-CCNC: 17 U/L (ref 14–36)
BILIRUB DIRECT SERPL-MCNC: 0.7 MG/DL (ref 0–0.4)
BILIRUB SERPL-MCNC: 2 MG/DL (ref 0.2–1.3)
BUN SERPL-MCNC: 16 MG/DL (ref 7–20)
CALCIUM: 10.1 MG/DL (ref 8.4–10.2)
CHLORIDE SERPL-SCNC: 95 MMOL/L (ref 98–107)
CO2 SERPL-SCNC: 28 MMOL/L (ref 22–30)
GLUCOSE SERPL-MCNC: 395 MG/DL (ref 75–110)
POTASSIUM SERPL-SCNC: 4.6 MMOL/L (ref 3.6–5)
PROT SERPL-MCNC: 7.7 G/DL (ref 6.3–8.2)
SODIUM SERPL-SCNC: 136 MMOL/L (ref 137–145)

## 2018-02-05 PROCEDURE — 36415 COLL VENOUS BLD VENIPUNCTURE: CPT

## 2018-02-05 PROCEDURE — 80053 COMPREHEN METABOLIC PANEL: CPT
